# Patient Record
Sex: FEMALE | Race: WHITE | NOT HISPANIC OR LATINO | Employment: OTHER | URBAN - METROPOLITAN AREA
[De-identification: names, ages, dates, MRNs, and addresses within clinical notes are randomized per-mention and may not be internally consistent; named-entity substitution may affect disease eponyms.]

---

## 2022-05-26 RX ORDER — METOPROLOL SUCCINATE 25 MG/1
25 TABLET, EXTENDED RELEASE ORAL DAILY
COMMUNITY
Start: 2022-03-06 | End: 2022-05-26 | Stop reason: SDUPTHER

## 2022-05-26 NOTE — TELEPHONE ENCOUNTER
----- Message from Beverly Cannon sent at 5/26/2022  2:55 PM CDT -----  Regarding: Refill  Type:  RX Refill Request    Who Called: Patient  Refill or New Rx:Refill  RX Name and Strength:Metoprolol 25mg  How is the patient currently taking it? (ex. 1XDay):QD  Is this a 30 day or 90 day RX:30  Preferred Pharmacy with phone number:[facility]  Local or Mail Order:Mail  Ordering Provider:Juan  Would the patient rather a call back or a response via [facility]? Call  Best Call Back Number:[phone]  Additional Information:

## 2022-05-27 RX ORDER — METOPROLOL SUCCINATE 25 MG/1
25 TABLET, EXTENDED RELEASE ORAL DAILY
Qty: 90 TABLET | Refills: 3 | Status: SHIPPED | OUTPATIENT
Start: 2022-05-27 | End: 2023-10-19 | Stop reason: SDUPTHER

## 2022-06-01 DIAGNOSIS — E78.5 HYPERLIPIDEMIA, UNSPECIFIED HYPERLIPIDEMIA TYPE: ICD-10-CM

## 2022-06-01 DIAGNOSIS — I10 HYPERTENSION, UNSPECIFIED TYPE: Primary | ICD-10-CM

## 2022-06-01 DIAGNOSIS — F41.9 ANXIETY: ICD-10-CM

## 2022-06-01 DIAGNOSIS — Z92.29 HX OF LONG TERM USE OF BLOOD THINNERS: ICD-10-CM

## 2022-06-01 RX ORDER — MEMANTINE HYDROCHLORIDE 10 MG/1
10 TABLET ORAL 2 TIMES DAILY
COMMUNITY
Start: 2022-04-05 | End: 2023-04-20

## 2022-06-01 RX ORDER — APIXABAN 5 MG/1
5 TABLET, FILM COATED ORAL 2 TIMES DAILY
COMMUNITY
Start: 2022-03-06 | End: 2022-06-01 | Stop reason: SDUPTHER

## 2022-06-01 RX ORDER — ATORVASTATIN CALCIUM 40 MG/1
40 TABLET, FILM COATED ORAL DAILY
COMMUNITY
End: 2022-06-01 | Stop reason: SDUPTHER

## 2022-06-01 RX ORDER — AMLODIPINE BESYLATE 2.5 MG/1
2.5 TABLET ORAL DAILY
COMMUNITY
Start: 2022-03-06 | End: 2022-06-01 | Stop reason: SDUPTHER

## 2022-06-01 RX ORDER — APIXABAN 5 MG/1
5 TABLET, FILM COATED ORAL 2 TIMES DAILY
Qty: 180 TABLET | Refills: 3 | Status: SHIPPED | OUTPATIENT
Start: 2022-06-01 | End: 2022-07-28

## 2022-06-01 RX ORDER — VENLAFAXINE HYDROCHLORIDE 150 MG/1
150 CAPSULE, EXTENDED RELEASE ORAL DAILY
COMMUNITY
Start: 2022-03-06 | End: 2022-06-01 | Stop reason: SDUPTHER

## 2022-06-01 RX ORDER — VENLAFAXINE HYDROCHLORIDE 150 MG/1
150 CAPSULE, EXTENDED RELEASE ORAL DAILY
Qty: 90 CAPSULE | Refills: 3 | Status: SHIPPED | OUTPATIENT
Start: 2022-06-01 | End: 2023-07-24 | Stop reason: SDUPTHER

## 2022-06-01 RX ORDER — AMLODIPINE BESYLATE 2.5 MG/1
2.5 TABLET ORAL DAILY
Qty: 90 TABLET | Refills: 3 | Status: SHIPPED | OUTPATIENT
Start: 2022-06-01 | End: 2023-07-10 | Stop reason: SDUPTHER

## 2022-06-01 NOTE — TELEPHONE ENCOUNTER
----- Message from Melody Erazo sent at 6/1/2022 11:39 AM CDT -----  Regarding: medication  .Type:  Needs Medical Advice    Who Called: Pt's daughter  Symptoms (please be specific): NA  How long has patient had these symptoms:  NA  Pharmacy name and phone #:  [facility]  Would the patient rather a call back or a response via [facility]? Call back  Best Call Back Number: [phone]  Additional Information: Pt's daughter stated that a lot of meds were called in to [facility] and they received a notification that only 2 were being filled.

## 2022-06-02 RX ORDER — ATORVASTATIN CALCIUM 40 MG/1
40 TABLET, FILM COATED ORAL DAILY
Qty: 90 TABLET | Refills: 3 | Status: SHIPPED | OUTPATIENT
Start: 2022-06-02 | End: 2022-09-28

## 2022-07-24 PROBLEM — Z86.79 HISTORY OF ATRIAL FIBRILLATION: Chronic | Status: ACTIVE | Noted: 2022-07-24

## 2022-07-24 PROBLEM — E78.2 MIXED HYPERLIPIDEMIA: Chronic | Status: ACTIVE | Noted: 2022-07-24

## 2022-07-24 PROBLEM — I10 PRIMARY HYPERTENSION: Chronic | Status: ACTIVE | Noted: 2022-07-24

## 2022-07-24 PROBLEM — E55.9 VITAMIN D DEFICIENCY: Chronic | Status: ACTIVE | Noted: 2022-07-24

## 2022-07-24 PROBLEM — F41.8 ANXIETY WITH DEPRESSION: Chronic | Status: ACTIVE | Noted: 2022-07-24

## 2022-07-24 PROBLEM — F41.8 ANXIETY WITH DEPRESSION: Status: ACTIVE | Noted: 2022-07-24

## 2022-07-24 PROBLEM — E55.9 VITAMIN D DEFICIENCY: Status: ACTIVE | Noted: 2022-07-24

## 2022-07-24 PROBLEM — E78.2 MIXED HYPERLIPIDEMIA: Status: ACTIVE | Noted: 2022-07-24

## 2022-07-24 PROBLEM — I10 PRIMARY HYPERTENSION: Status: ACTIVE | Noted: 2022-07-24

## 2022-07-24 PROBLEM — Z86.79 HISTORY OF ATRIAL FIBRILLATION: Status: ACTIVE | Noted: 2022-07-24

## 2022-07-24 NOTE — PROGRESS NOTES
"Subjective:       Patient ID: Marge Thompson is a 66 y.o. female.    Chief Complaint: Oral Surgical Clearance    HPI   Patient presents to the clinic for clearance for a dental procedure.  Dental procedure may include extractions, possible fillings, would canal, deep cleaning.  Notably, she takes Eliquis daily.  She does not remember how long she has been taking this, and she has not seen a cardiologist in years according to her.  She has a diagnosis of atrial fibrillation, but she has not been in atrial fibrillation either at the last examination or at this examination.  She tends to feel like she would know if she was in AFib, and she states that she does not remember the last time that she had that feeling.    Review of Systems   Constitutional: Negative.  Negative for fatigue and fever.   HENT: Negative.  Negative for sore throat and trouble swallowing.    Eyes: Negative.  Negative for redness and visual disturbance.   Respiratory: Negative.  Negative for chest tightness and shortness of breath.    Cardiovascular: Negative.  Negative for chest pain.   Gastrointestinal: Negative.  Negative for abdominal pain, blood in stool and nausea.   Endocrine: Negative.  Negative for cold intolerance, heat intolerance and polyuria.   Genitourinary: Negative.    Musculoskeletal: Negative.  Negative for arthralgias, gait problem and joint swelling.   Integumentary:  Negative for rash. Negative.   Neurological: Negative.  Negative for dizziness, weakness and headaches.   Psychiatric/Behavioral: Negative.  Negative for agitation and dysphoric mood.         Objective:     Vitals:    07/26/22 1333   BP: 104/76   BP Location: Left arm   Patient Position: Sitting   BP Method: Large (Automatic)   Pulse: 101   Resp: 18   SpO2: 96%   Weight: 87.8 kg (193 lb 9.6 oz)   Height: 5' 11" (1.803 m)   Body mass index is 27 kg/m².     Physical Exam  Constitutional:       Appearance: Normal appearance.   HENT:      Head: Normocephalic.      " Nose: Nose normal.      Mouth/Throat:      Pharynx: Oropharynx is clear.   Eyes:      Conjunctiva/sclera: Conjunctivae normal.   Cardiovascular:      Rate and Rhythm: Normal rate and regular rhythm.   Pulmonary:      Effort: Pulmonary effort is normal.      Breath sounds: Normal breath sounds.   Abdominal:      General: Abdomen is flat. Bowel sounds are normal.      Palpations: Abdomen is soft.   Musculoskeletal:         General: Normal range of motion.      Cervical back: Neck supple.   Skin:     General: Skin is warm and dry.   Neurological:      General: No focal deficit present.      Mental Status: She is oriented to person, place, and time.   Psychiatric:         Mood and Affect: Mood normal.         Behavior: Behavior normal.              Assessment:     Problem List Items Addressed This Visit        Other    Preoperative clearance - Primary    Current Assessment & Plan     More than likely, the patient has not been in atrial fibrillation for some time.  Therefore, we will discontinue the Eliquis completely, and, this is a low risk procedure so she can proceed with the dental procedure 48 hours after the Eliquis has been discontinued.  She would discontinue the Eliquis today.  In the future, we may have to reconsider putting her on a blood thinner depending on her cardiac status, her next appointment is in September, if she has any symptoms of being in atrial fibrillation before then she may contact the clinic or come in for a visit.                    Medication List with Changes/Refills   Current Medications    AMLODIPINE (NORVASC) 2.5 MG TABLET    Take 1 tablet (2.5 mg total) by mouth once daily.    ATORVASTATIN (LIPITOR) 40 MG TABLET    Take 1 tablet (40 mg total) by mouth once daily.    ELIQUIS 5 MG TAB    Take 1 tablet (5 mg total) by mouth 2 (two) times daily.    MEMANTINE (NAMENDA) 10 MG TAB    Take 10 mg by mouth 2 (two) times daily.    METOPROLOL SUCCINATE (TOPROL-XL) 25 MG 24 HR TABLET    Take 1  tablet (25 mg total) by mouth once daily.    VENLAFAXINE (EFFEXOR-XR) 150 MG CP24    Take 1 capsule (150 mg total) by mouth once daily.     Plan:             No follow-ups on file.

## 2022-07-26 ENCOUNTER — OFFICE VISIT (OUTPATIENT)
Dept: PRIMARY CARE CLINIC | Facility: CLINIC | Age: 67
End: 2022-07-26
Payer: MEDICARE

## 2022-07-26 VITALS
WEIGHT: 193.63 LBS | RESPIRATION RATE: 18 BRPM | HEART RATE: 101 BPM | OXYGEN SATURATION: 96 % | SYSTOLIC BLOOD PRESSURE: 104 MMHG | HEIGHT: 71 IN | BODY MASS INDEX: 27.11 KG/M2 | DIASTOLIC BLOOD PRESSURE: 76 MMHG

## 2022-07-26 DIAGNOSIS — Z01.818 PREOPERATIVE CLEARANCE: Primary | ICD-10-CM

## 2022-07-26 PROCEDURE — 99214 PR OFFICE/OUTPT VISIT, EST, LEVL IV, 30-39 MIN: ICD-10-PCS | Mod: ,,, | Performed by: GENERAL PRACTICE

## 2022-07-26 PROCEDURE — 99214 OFFICE O/P EST MOD 30 MIN: CPT | Mod: ,,, | Performed by: GENERAL PRACTICE

## 2022-07-26 NOTE — ASSESSMENT & PLAN NOTE
More than likely, the patient has not been in atrial fibrillation for some time.  Therefore, we will discontinue the Eliquis completely, and, this is a low risk procedure so she can proceed with the dental procedure 48 hours after the Eliquis has been discontinued.  She would discontinue the Eliquis today.  In the future, we may have to reconsider putting her on a blood thinner depending on her cardiac status, her next appointment is in September, if she has any symptoms of being in atrial fibrillation before then she may contact the clinic or come in for a visit.

## 2022-07-28 ENCOUNTER — OFFICE VISIT (OUTPATIENT)
Dept: NEUROLOGY | Facility: CLINIC | Age: 67
End: 2022-07-28
Payer: MEDICARE

## 2022-07-28 VITALS
SYSTOLIC BLOOD PRESSURE: 140 MMHG | DIASTOLIC BLOOD PRESSURE: 64 MMHG | WEIGHT: 192 LBS | HEIGHT: 71 IN | BODY MASS INDEX: 26.88 KG/M2

## 2022-07-28 DIAGNOSIS — F03.90 DEMENTIA WITHOUT BEHAVIORAL DISTURBANCE, UNSPECIFIED DEMENTIA TYPE: Primary | ICD-10-CM

## 2022-07-28 PROCEDURE — 1101F PT FALLS ASSESS-DOCD LE1/YR: CPT | Mod: CPTII,S$GLB,, | Performed by: PSYCHIATRY & NEUROLOGY

## 2022-07-28 PROCEDURE — 1126F AMNT PAIN NOTED NONE PRSNT: CPT | Mod: CPTII,S$GLB,, | Performed by: PSYCHIATRY & NEUROLOGY

## 2022-07-28 PROCEDURE — 1159F MED LIST DOCD IN RCRD: CPT | Mod: CPTII,S$GLB,, | Performed by: PSYCHIATRY & NEUROLOGY

## 2022-07-28 PROCEDURE — 3288F FALL RISK ASSESSMENT DOCD: CPT | Mod: CPTII,S$GLB,, | Performed by: PSYCHIATRY & NEUROLOGY

## 2022-07-28 PROCEDURE — 3077F SYST BP >= 140 MM HG: CPT | Mod: CPTII,S$GLB,, | Performed by: PSYCHIATRY & NEUROLOGY

## 2022-07-28 PROCEDURE — 1159F PR MEDICATION LIST DOCUMENTED IN MEDICAL RECORD: ICD-10-PCS | Mod: CPTII,S$GLB,, | Performed by: PSYCHIATRY & NEUROLOGY

## 2022-07-28 PROCEDURE — 3077F PR MOST RECENT SYSTOLIC BLOOD PRESSURE >= 140 MM HG: ICD-10-PCS | Mod: CPTII,S$GLB,, | Performed by: PSYCHIATRY & NEUROLOGY

## 2022-07-28 PROCEDURE — 3078F DIAST BP <80 MM HG: CPT | Mod: CPTII,S$GLB,, | Performed by: PSYCHIATRY & NEUROLOGY

## 2022-07-28 PROCEDURE — 1126F PR PAIN SEVERITY QUANTIFIED, NO PAIN PRESENT: ICD-10-PCS | Mod: CPTII,S$GLB,, | Performed by: PSYCHIATRY & NEUROLOGY

## 2022-07-28 PROCEDURE — 3078F PR MOST RECENT DIASTOLIC BLOOD PRESSURE < 80 MM HG: ICD-10-PCS | Mod: CPTII,S$GLB,, | Performed by: PSYCHIATRY & NEUROLOGY

## 2022-07-28 PROCEDURE — 99999 PR PBB SHADOW E&M-EST. PATIENT-LVL III: CPT | Mod: PBBFAC,,, | Performed by: PSYCHIATRY & NEUROLOGY

## 2022-07-28 PROCEDURE — 99999 PR PBB SHADOW E&M-EST. PATIENT-LVL III: ICD-10-PCS | Mod: PBBFAC,,, | Performed by: PSYCHIATRY & NEUROLOGY

## 2022-07-28 PROCEDURE — 3008F PR BODY MASS INDEX (BMI) DOCUMENTED: ICD-10-PCS | Mod: CPTII,S$GLB,, | Performed by: PSYCHIATRY & NEUROLOGY

## 2022-07-28 PROCEDURE — 1101F PR PT FALLS ASSESS DOC 0-1 FALLS W/OUT INJ PAST YR: ICD-10-PCS | Mod: CPTII,S$GLB,, | Performed by: PSYCHIATRY & NEUROLOGY

## 2022-07-28 PROCEDURE — 99213 OFFICE O/P EST LOW 20 MIN: CPT | Mod: S$GLB,,, | Performed by: PSYCHIATRY & NEUROLOGY

## 2022-07-28 PROCEDURE — 3288F PR FALLS RISK ASSESSMENT DOCUMENTED: ICD-10-PCS | Mod: CPTII,S$GLB,, | Performed by: PSYCHIATRY & NEUROLOGY

## 2022-07-28 PROCEDURE — 3008F BODY MASS INDEX DOCD: CPT | Mod: CPTII,S$GLB,, | Performed by: PSYCHIATRY & NEUROLOGY

## 2022-07-28 PROCEDURE — 99213 PR OFFICE/OUTPT VISIT, EST, LEVL III, 20-29 MIN: ICD-10-PCS | Mod: S$GLB,,, | Performed by: PSYCHIATRY & NEUROLOGY

## 2022-07-28 RX ORDER — LANOLIN ALCOHOL/MO/W.PET/CERES
1000 CREAM (GRAM) TOPICAL DAILY
COMMUNITY
Start: 2022-03-23

## 2022-07-28 RX ORDER — CHOLECALCIFEROL (VITAMIN D3) 25 MCG
1000 TABLET ORAL DAILY
COMMUNITY
End: 2022-07-28

## 2022-07-28 NOTE — PROGRESS NOTES
Subjective:       Patient ID: Marge Thompson is a 66 y.o. female.    Chief Complaint: Memory Loss (C/o memory about the same), Amnesia, and Medication Refill (Namenda)    HPI short term memory loss x 1  Year or more  Stroke history  Lives alone  Started on namenda 3 mos ago; here for followup  No issues  No SEs  Maybe helping a little  No falls  No issue swith local driving  Review of Systems    The remainder of the 14 system ROS is noncontributory or negative unless mentioned/reviewed above.    Objective:      Physical Exam    Assessment:     Mental Status: Alert and oriented x3. Language is fluent with good comprehension.    Cranial Nerve: Pupils are equal, round, and reactive to light. Visual fields are intact to confrontation. Normal fundi. Ocular movements are intact. Face is symmetric at rest and with activation with intact sensation throughout. Hearing intact to finger rub bilaterally. Muscles of tongue and palate activate symmetrically. No dysarthria. Strength is full in sternocleidomastoid and trapezius bilaterally.    Motor: Muscle bulk and tone are normal. Strength is 5/5 in all four extremities both proximally and distally. Intact fine motor movements bilaterally. There is no pronator drift or satelliting on arm roll.    Sensory: Sensation is intact to light touch, pinprick, vibration, and proprioception throughout. Romberg is negative.    Reflexes: 2+ and symmetric at the biceps, triceps, brachioradialis, patella, and Achilles bilaterally. Plantar response is flexor bilaterally.    Coordination: No dysmetria on finger-nose-finger or heel-knee-shin. Normal rapid alternating movements. Fast finger tapping with normal amplitude and speed.    Gait: Narrow based with normal stride length and good arm swing bilaterally. Able to walk on heels, toes, and in tandem.    Problem List Items Addressed This Visit    None     Visit Diagnoses     Dementia without behavioral disturbance, unspecified dementia type    -   Primary          Plan:       Continue namenda  9 mos f/u

## 2022-09-06 ENCOUNTER — TELEPHONE (OUTPATIENT)
Dept: NEUROLOGY | Facility: CLINIC | Age: 67
End: 2022-09-06
Payer: MEDICARE

## 2022-09-06 DIAGNOSIS — Z78.9 SELF-CARE DEFICIT: Primary | ICD-10-CM

## 2022-09-06 NOTE — TELEPHONE ENCOUNTER
Reports pt has not been letting her dogs out, does not eat, lays in the bed all day, hardly gets up, did not change her clothes for 3 weeks and is not bathing,   Is asking if pt can be referral to home health services.  Is asking if she is not available at [phone] to contact her  Reyes at [phone].    LOV: 0/4/05/22    NOV: none

## 2022-09-06 NOTE — TELEPHONE ENCOUNTER
[facility] they received a referral for this pt and they are not in network with [facility]. [facility] is in network. Phone: [phone], Fax: [phone].    LOV: 07/28/22    NOV: 04/27/23

## 2022-09-15 DIAGNOSIS — E78.2 MIXED HYPERLIPIDEMIA: Primary | Chronic | ICD-10-CM

## 2022-09-15 DIAGNOSIS — Z00.00 ENCOUNTER FOR WELLNESS EXAMINATION: ICD-10-CM

## 2022-09-15 DIAGNOSIS — Z13.6 ENCOUNTER FOR LIPID SCREENING FOR CARDIOVASCULAR DISEASE: ICD-10-CM

## 2022-09-15 DIAGNOSIS — Z13.220 ENCOUNTER FOR LIPID SCREENING FOR CARDIOVASCULAR DISEASE: ICD-10-CM

## 2022-09-22 ENCOUNTER — DOCUMENTATION ONLY (OUTPATIENT)
Dept: PRIMARY CARE CLINIC | Facility: CLINIC | Age: 67
End: 2022-09-22
Payer: MEDICARE

## 2022-09-22 DIAGNOSIS — R04.0 NOSEBLEED: Primary | ICD-10-CM

## 2022-09-24 PROCEDURE — G0180 PR HOME HEALTH MD CERTIFICATION: ICD-10-PCS | Mod: ,,, | Performed by: PSYCHIATRY & NEUROLOGY

## 2022-09-24 PROCEDURE — G0180 MD CERTIFICATION HHA PATIENT: HCPCS | Mod: ,,, | Performed by: PSYCHIATRY & NEUROLOGY

## 2022-09-26 ENCOUNTER — TELEPHONE (OUTPATIENT)
Dept: PRIMARY CARE CLINIC | Facility: CLINIC | Age: 67
End: 2022-09-26
Payer: MEDICARE

## 2022-09-26 NOTE — TELEPHONE ENCOUNTER
----- Message from Briana Dalton sent at 9/23/2022  9:36 AM CDT -----  Regarding: call back  .Type:  Needs Medical Advice    Who Called: Nola Curtis   Symptoms (please be specific):    How long has patient had these symptoms:    Pharmacy name and phone #:    Would the patient rather a call back or a response via [facility]? Call back   Best Call Back Number: [phone]  Additional Information: Nola needs to verify that Dr. Martinez will follow patient once she goes into assisted living.

## 2022-09-27 NOTE — PROGRESS NOTES
"Subjective:       Patient ID: Marge Thompson is a 66 y.o. female.    Chief Complaint: No chief complaint on file.    Patient presents to the clinic to discuss moving to an assisted living facility, and she needs paperwork to be completed.  She does have multiple medical issues but seems to be suffering from significant dementia, worsening short-term memory as of late.  She is definitely not able to take care of herself anymore.  He was hospitalized a few weeks ago for failure to thrive, not eating, laying in bed all day, she has improved a bit from that status.  She does have a DNR form completed.  Review of Systems   Constitutional: Negative.  Negative for fatigue and fever.   HENT: Negative.  Negative for sore throat and trouble swallowing.    Eyes: Negative.  Negative for redness and visual disturbance.   Respiratory: Negative.  Negative for chest tightness and shortness of breath.    Cardiovascular: Negative.  Negative for chest pain.   Gastrointestinal: Negative.  Negative for abdominal pain, blood in stool and nausea.   Endocrine: Negative.  Negative for cold intolerance, heat intolerance and polyuria.   Genitourinary: Negative.    Musculoskeletal: Negative.  Negative for arthralgias, gait problem and joint swelling.   Integumentary:  Negative for rash. Negative.   Neurological: Negative.  Positive for memory loss. Negative for dizziness, weakness and headaches.   Psychiatric/Behavioral: Negative.  Negative for agitation and dysphoric mood.        Objective:     Vitals:    09/28/22 1331   BP: (!) 128/90   BP Location: Right arm   Patient Position: Sitting   BP Method: Large (Automatic)   Pulse: 94   Resp: 16   Temp: 97.4 °F (36.3 °C)   TempSrc: Oral   SpO2: 95%   Weight: 82.1 kg (181 lb)   Height: 5' 11" (1.803 m)   Body mass index is 25.24 kg/m².     Physical Exam  Constitutional:       Appearance: Normal appearance.   HENT:      Head: Normocephalic.      Mouth/Throat:      Pharynx: Oropharynx is clear. "   Eyes:      Conjunctiva/sclera: Conjunctivae normal.      Pupils: Pupils are equal, round, and reactive to light.   Cardiovascular:      Rate and Rhythm: Normal rate and regular rhythm.      Heart sounds: Normal heart sounds.   Pulmonary:      Effort: Pulmonary effort is normal.      Breath sounds: Normal breath sounds.   Abdominal:      General: Abdomen is flat.      Palpations: Abdomen is soft.   Musculoskeletal:         General: Normal range of motion.      Cervical back: Neck supple.   Skin:     General: Skin is warm and dry.   Neurological:      General: No focal deficit present.      Mental Status: She is oriented to person, place, and time.   Psychiatric:         Mood and Affect: Mood normal.         Behavior: Behavior normal.         Thought Content: Thought content normal.         Judgment: Judgment normal.         Assessment:     Problem List Items Addressed This Visit          Neuro    Late onset Alzheimer's dementia without behavioral disturbance (Chronic)    Current Assessment & Plan     I did complete her form, and signed her living will.  She will come back in in six months for a wellness exam.  She promised me that she would try to consume food, and supplements to her food if necessary.               Medication List with Changes/Refills   Current Medications    AMLODIPINE (NORVASC) 2.5 MG TABLET    Take 1 tablet (2.5 mg total) by mouth once daily.    ASPIRIN (ECOTRIN) 81 MG EC TABLET    Take 1 tablet by mouth every morning.    CYANOCOBALAMIN (VITAMIN B-12) 1000 MCG TABLET    Take 1,000 mcg by mouth once daily.    ERGOCALCIFEROL, VITAMIN D2, ORAL    Take 1 tablet by mouth once daily.    MEMANTINE (NAMENDA) 10 MG TAB    Take 10 mg by mouth 2 (two) times daily.    METOPROLOL SUCCINATE (TOPROL-XL) 25 MG 24 HR TABLET    Take 1 tablet (25 mg total) by mouth once daily.    VENLAFAXINE (EFFEXOR-XR) 150 MG CP24    Take 1 capsule (150 mg total) by mouth once daily.   Discontinued Medications    ATORVASTATIN  (LIPITOR) 40 MG TABLET    Take 1 tablet (40 mg total) by mouth once daily.     Plan:             Follow up in about 6 months (around 3/28/2023) for Wellness.

## 2022-09-28 ENCOUNTER — OFFICE VISIT (OUTPATIENT)
Dept: PRIMARY CARE CLINIC | Facility: CLINIC | Age: 67
End: 2022-09-28
Payer: MEDICARE

## 2022-09-28 VITALS
HEART RATE: 94 BPM | SYSTOLIC BLOOD PRESSURE: 128 MMHG | BODY MASS INDEX: 25.34 KG/M2 | WEIGHT: 181 LBS | TEMPERATURE: 97 F | HEIGHT: 71 IN | OXYGEN SATURATION: 95 % | RESPIRATION RATE: 16 BRPM | DIASTOLIC BLOOD PRESSURE: 90 MMHG

## 2022-09-28 DIAGNOSIS — G30.1 LATE ONSET ALZHEIMER'S DEMENTIA WITHOUT BEHAVIORAL DISTURBANCE: Chronic | ICD-10-CM

## 2022-09-28 DIAGNOSIS — F02.80 LATE ONSET ALZHEIMER'S DEMENTIA WITHOUT BEHAVIORAL DISTURBANCE: Chronic | ICD-10-CM

## 2022-09-28 PROBLEM — Z01.818 PREOPERATIVE CLEARANCE: Status: RESOLVED | Noted: 2022-07-26 | Resolved: 2022-09-28

## 2022-09-28 PROBLEM — Z86.59 HISTORY OF ASPERGER'S SYNDROME: Chronic | Status: ACTIVE | Noted: 2022-09-28

## 2022-09-28 PROBLEM — Z86.59 HISTORY OF ASPERGER'S SYNDROME: Chronic | Status: RESOLVED | Noted: 2022-09-28 | Resolved: 2022-09-28

## 2022-09-28 PROCEDURE — 1159F PR MEDICATION LIST DOCUMENTED IN MEDICAL RECORD: ICD-10-PCS | Mod: CPTII,,, | Performed by: GENERAL PRACTICE

## 2022-09-28 PROCEDURE — 99213 OFFICE O/P EST LOW 20 MIN: CPT | Mod: ,,, | Performed by: GENERAL PRACTICE

## 2022-09-28 PROCEDURE — 1160F PR REVIEW ALL MEDS BY PRESCRIBER/CLIN PHARMACIST DOCUMENTED: ICD-10-PCS | Mod: CPTII,,, | Performed by: GENERAL PRACTICE

## 2022-09-28 PROCEDURE — 3074F SYST BP LT 130 MM HG: CPT | Mod: CPTII,,, | Performed by: GENERAL PRACTICE

## 2022-09-28 PROCEDURE — 3008F PR BODY MASS INDEX (BMI) DOCUMENTED: ICD-10-PCS | Mod: CPTII,,, | Performed by: GENERAL PRACTICE

## 2022-09-28 PROCEDURE — 3080F PR MOST RECENT DIASTOLIC BLOOD PRESSURE >= 90 MM HG: ICD-10-PCS | Mod: CPTII,,, | Performed by: GENERAL PRACTICE

## 2022-09-28 PROCEDURE — 3080F DIAST BP >= 90 MM HG: CPT | Mod: CPTII,,, | Performed by: GENERAL PRACTICE

## 2022-09-28 PROCEDURE — 1159F MED LIST DOCD IN RCRD: CPT | Mod: CPTII,,, | Performed by: GENERAL PRACTICE

## 2022-09-28 PROCEDURE — 3074F PR MOST RECENT SYSTOLIC BLOOD PRESSURE < 130 MM HG: ICD-10-PCS | Mod: CPTII,,, | Performed by: GENERAL PRACTICE

## 2022-09-28 PROCEDURE — 3008F BODY MASS INDEX DOCD: CPT | Mod: CPTII,,, | Performed by: GENERAL PRACTICE

## 2022-09-28 PROCEDURE — 1160F RVW MEDS BY RX/DR IN RCRD: CPT | Mod: CPTII,,, | Performed by: GENERAL PRACTICE

## 2022-09-28 PROCEDURE — 99213 PR OFFICE/OUTPT VISIT, EST, LEVL III, 20-29 MIN: ICD-10-PCS | Mod: ,,, | Performed by: GENERAL PRACTICE

## 2022-09-28 RX ORDER — ASPIRIN 81 MG/1
1 TABLET ORAL EVERY MORNING
COMMUNITY
Start: 2022-09-24 | End: 2023-09-24

## 2022-09-28 NOTE — ASSESSMENT & PLAN NOTE
I did complete her form, and signed her living will.  She will come back in in six months for a wellness exam.  She promised me that she would try to consume food, and supplements to her food if necessary.

## 2022-10-11 ENCOUNTER — EXTERNAL HOME HEALTH (OUTPATIENT)
Dept: HOME HEALTH SERVICES | Facility: HOSPITAL | Age: 67
End: 2022-10-11
Payer: MEDICARE

## 2022-10-17 ENCOUNTER — DOCUMENT SCAN (OUTPATIENT)
Dept: HOME HEALTH SERVICES | Facility: HOSPITAL | Age: 67
End: 2022-10-17
Payer: MEDICARE

## 2022-10-27 ENCOUNTER — DOCUMENT SCAN (OUTPATIENT)
Dept: HOME HEALTH SERVICES | Facility: HOSPITAL | Age: 67
End: 2022-10-27
Payer: MEDICARE

## 2022-10-31 ENCOUNTER — DOCUMENT SCAN (OUTPATIENT)
Dept: HOME HEALTH SERVICES | Facility: HOSPITAL | Age: 67
End: 2022-10-31
Payer: MEDICARE

## 2022-11-23 PROCEDURE — G0179 PR HOME HEALTH MD RECERTIFICATION: ICD-10-PCS | Mod: ,,, | Performed by: PSYCHIATRY & NEUROLOGY

## 2022-11-23 PROCEDURE — G0179 MD RECERTIFICATION HHA PT: HCPCS | Mod: ,,, | Performed by: PSYCHIATRY & NEUROLOGY

## 2022-11-30 ENCOUNTER — EXTERNAL HOME HEALTH (OUTPATIENT)
Dept: HOME HEALTH SERVICES | Facility: HOSPITAL | Age: 67
End: 2022-11-30
Payer: MEDICARE

## 2023-04-14 ENCOUNTER — CLINICAL SUPPORT (OUTPATIENT)
Dept: PRIMARY CARE CLINIC | Facility: CLINIC | Age: 68
End: 2023-04-14
Payer: MEDICARE

## 2023-04-14 DIAGNOSIS — Z00.00 ENCOUNTER FOR WELLNESS EXAMINATION: ICD-10-CM

## 2023-04-14 DIAGNOSIS — Z00.00 ENCOUNTER FOR WELLNESS EXAMINATION IN ADULT: Primary | ICD-10-CM

## 2023-04-14 DIAGNOSIS — Z13.6 ENCOUNTER FOR LIPID SCREENING FOR CARDIOVASCULAR DISEASE: ICD-10-CM

## 2023-04-14 DIAGNOSIS — Z13.220 ENCOUNTER FOR LIPID SCREENING FOR CARDIOVASCULAR DISEASE: ICD-10-CM

## 2023-04-14 DIAGNOSIS — E78.2 MIXED HYPERLIPIDEMIA: ICD-10-CM

## 2023-04-14 LAB
ALBUMIN SERPL-MCNC: 3.7 G/DL (ref 3.4–4.8)
ALBUMIN/GLOB SERPL: 1.2 RATIO (ref 1.1–2)
ALP SERPL-CCNC: 97 UNIT/L (ref 40–150)
ALT SERPL-CCNC: 5 UNIT/L (ref 0–55)
AST SERPL-CCNC: 13 UNIT/L (ref 5–34)
BASOPHILS # BLD AUTO: 0.1 X10(3)/MCL (ref 0–0.2)
BASOPHILS NFR BLD AUTO: 1.2 %
BILIRUBIN DIRECT+TOT PNL SERPL-MCNC: 0.3 MG/DL
BUN SERPL-MCNC: 20.5 MG/DL (ref 9.8–20.1)
CALCIUM SERPL-MCNC: 9.2 MG/DL (ref 8.4–10.2)
CHLORIDE SERPL-SCNC: 109 MMOL/L (ref 98–107)
CHOLEST SERPL-MCNC: 235 MG/DL
CHOLEST/HDLC SERPL: 5 {RATIO} (ref 0–5)
CO2 SERPL-SCNC: 25 MMOL/L (ref 23–31)
CREAT SERPL-MCNC: 1.37 MG/DL (ref 0.55–1.02)
EOSINOPHIL # BLD AUTO: 0.24 X10(3)/MCL (ref 0–0.9)
EOSINOPHIL NFR BLD AUTO: 3 %
ERYTHROCYTE [DISTWIDTH] IN BLOOD BY AUTOMATED COUNT: 14.6 % (ref 11.5–17)
GFR SERPLBLD CREATININE-BSD FMLA CKD-EPI: 42 MLS/MIN/1.73/M2
GLOBULIN SER-MCNC: 3 GM/DL (ref 2.4–3.5)
GLUCOSE SERPL-MCNC: 119 MG/DL (ref 82–115)
HCT VFR BLD AUTO: 44.5 % (ref 37–47)
HDLC SERPL-MCNC: 50 MG/DL (ref 35–60)
HGB BLD-MCNC: 14.4 G/DL (ref 12–16)
IMM GRANULOCYTES # BLD AUTO: 0.03 X10(3)/MCL (ref 0–0.04)
IMM GRANULOCYTES NFR BLD AUTO: 0.4 %
LDLC SERPL CALC-MCNC: 152 MG/DL (ref 50–140)
LYMPHOCYTES # BLD AUTO: 2.93 X10(3)/MCL (ref 0.6–4.6)
LYMPHOCYTES NFR BLD AUTO: 36.1 %
MCH RBC QN AUTO: 27.4 PG (ref 27–31)
MCHC RBC AUTO-ENTMCNC: 32.4 G/DL (ref 33–36)
MCV RBC AUTO: 84.6 FL (ref 80–94)
MONOCYTES # BLD AUTO: 0.56 X10(3)/MCL (ref 0.1–1.3)
MONOCYTES NFR BLD AUTO: 6.9 %
NEUTROPHILS # BLD AUTO: 4.25 X10(3)/MCL (ref 2.1–9.2)
NEUTROPHILS NFR BLD AUTO: 52.4 %
NRBC BLD AUTO-RTO: 0 %
PLATELET # BLD AUTO: 247 X10(3)/MCL (ref 130–400)
PMV BLD AUTO: 10.8 FL (ref 7.4–10.4)
POTASSIUM SERPL-SCNC: 4.2 MMOL/L (ref 3.5–5.1)
PROT SERPL-MCNC: 6.7 GM/DL (ref 5.8–7.6)
RBC # BLD AUTO: 5.26 X10(6)/MCL (ref 4.2–5.4)
SODIUM SERPL-SCNC: 143 MMOL/L (ref 136–145)
TRIGL SERPL-MCNC: 166 MG/DL (ref 37–140)
TSH SERPL-ACNC: 2.68 UIU/ML (ref 0.35–4.94)
VLDLC SERPL CALC-MCNC: 33 MG/DL
WBC # SPEC AUTO: 8.1 X10(3)/MCL (ref 4.5–11.5)

## 2023-04-14 PROCEDURE — 36415 PR COLLECTION VENOUS BLOOD,VENIPUNCTURE: ICD-10-PCS | Mod: ,,, | Performed by: GENERAL PRACTICE

## 2023-04-14 PROCEDURE — 80061 LIPID PANEL: CPT | Performed by: GENERAL PRACTICE

## 2023-04-14 PROCEDURE — 86803 HEPATITIS C AB TEST: CPT | Performed by: GENERAL PRACTICE

## 2023-04-14 PROCEDURE — 36415 COLL VENOUS BLD VENIPUNCTURE: CPT | Mod: ,,, | Performed by: GENERAL PRACTICE

## 2023-04-14 PROCEDURE — 36415 COLL VENOUS BLD VENIPUNCTURE: CPT

## 2023-04-14 PROCEDURE — 80053 COMPREHEN METABOLIC PANEL: CPT | Performed by: GENERAL PRACTICE

## 2023-04-14 PROCEDURE — 84443 ASSAY THYROID STIM HORMONE: CPT | Performed by: GENERAL PRACTICE

## 2023-04-14 PROCEDURE — 85025 COMPLETE CBC W/AUTO DIFF WBC: CPT | Performed by: GENERAL PRACTICE

## 2023-04-14 NOTE — PROGRESS NOTES
Wellness labs drawn from the right arm with 21 G/22 G. Pt expressed no pain and tolerated well.

## 2023-04-15 LAB — HCV AB SERPL QL IA: ABNORMAL

## 2023-04-16 PROBLEM — I48.0 INTERMITTENT ATRIAL FIBRILLATION: Status: ACTIVE | Noted: 2023-04-16

## 2023-04-16 PROBLEM — E78.5 DYSLIPIDEMIA: Chronic | Status: ACTIVE | Noted: 2023-04-16

## 2023-04-16 PROBLEM — R41.3 AMNESIA: Status: ACTIVE | Noted: 2023-04-16

## 2023-04-16 PROBLEM — R73.03 PREDIABETES: Chronic | Status: ACTIVE | Noted: 2019-03-16

## 2023-04-16 PROBLEM — R73.03 PREDIABETES: Status: ACTIVE | Noted: 2019-03-16

## 2023-04-16 PROBLEM — E78.2 MIXED HYPERLIPIDEMIA: Chronic | Status: RESOLVED | Noted: 2022-07-24 | Resolved: 2023-04-16

## 2023-04-16 PROBLEM — I45.6: Status: ACTIVE | Noted: 2023-04-16

## 2023-04-17 ENCOUNTER — OFFICE VISIT (OUTPATIENT)
Dept: PRIMARY CARE CLINIC | Facility: CLINIC | Age: 68
End: 2023-04-17
Payer: MEDICARE

## 2023-04-17 VITALS
SYSTOLIC BLOOD PRESSURE: 144 MMHG | OXYGEN SATURATION: 95 % | WEIGHT: 214 LBS | DIASTOLIC BLOOD PRESSURE: 92 MMHG | HEIGHT: 71 IN | HEART RATE: 72 BPM | BODY MASS INDEX: 29.96 KG/M2 | RESPIRATION RATE: 18 BRPM

## 2023-04-17 DIAGNOSIS — E78.5 DYSLIPIDEMIA: Chronic | ICD-10-CM

## 2023-04-17 DIAGNOSIS — I10 PRIMARY HYPERTENSION: Chronic | ICD-10-CM

## 2023-04-17 DIAGNOSIS — R73.03 PREDIABETES: Chronic | ICD-10-CM

## 2023-04-17 DIAGNOSIS — F33.41 RECURRENT MAJOR DEPRESSIVE DISORDER, IN PARTIAL REMISSION: Chronic | ICD-10-CM

## 2023-04-17 DIAGNOSIS — Z86.79 HISTORY OF ATRIAL FIBRILLATION: Chronic | ICD-10-CM

## 2023-04-17 DIAGNOSIS — I48.0 INTERMITTENT ATRIAL FIBRILLATION: ICD-10-CM

## 2023-04-17 DIAGNOSIS — F02.80 LATE ONSET ALZHEIMER'S DEMENTIA WITHOUT BEHAVIORAL DISTURBANCE: ICD-10-CM

## 2023-04-17 DIAGNOSIS — G30.1 LATE ONSET ALZHEIMER'S DEMENTIA WITHOUT BEHAVIORAL DISTURBANCE: ICD-10-CM

## 2023-04-17 DIAGNOSIS — Z00.00 MEDICARE ANNUAL WELLNESS VISIT, SUBSEQUENT: Primary | ICD-10-CM

## 2023-04-17 DIAGNOSIS — N18.32 STAGE 3B CHRONIC KIDNEY DISEASE: Chronic | ICD-10-CM

## 2023-04-17 PROCEDURE — 1159F PR MEDICATION LIST DOCUMENTED IN MEDICAL RECORD: ICD-10-PCS | Mod: CPTII,,, | Performed by: GENERAL PRACTICE

## 2023-04-17 PROCEDURE — G0439 PPPS, SUBSEQ VISIT: HCPCS | Mod: ,,, | Performed by: GENERAL PRACTICE

## 2023-04-17 PROCEDURE — 3288F FALL RISK ASSESSMENT DOCD: CPT | Mod: CPTII,,, | Performed by: GENERAL PRACTICE

## 2023-04-17 PROCEDURE — 1101F PT FALLS ASSESS-DOCD LE1/YR: CPT | Mod: CPTII,,, | Performed by: GENERAL PRACTICE

## 2023-04-17 PROCEDURE — G0439 PR MEDICARE ANNUAL WELLNESS SUBSEQUENT VISIT: ICD-10-PCS | Mod: ,,, | Performed by: GENERAL PRACTICE

## 2023-04-17 PROCEDURE — 3008F PR BODY MASS INDEX (BMI) DOCUMENTED: ICD-10-PCS | Mod: CPTII,,, | Performed by: GENERAL PRACTICE

## 2023-04-17 PROCEDURE — 1101F PR PT FALLS ASSESS DOC 0-1 FALLS W/OUT INJ PAST YR: ICD-10-PCS | Mod: CPTII,,, | Performed by: GENERAL PRACTICE

## 2023-04-17 PROCEDURE — 1159F MED LIST DOCD IN RCRD: CPT | Mod: CPTII,,, | Performed by: GENERAL PRACTICE

## 2023-04-17 PROCEDURE — 1160F PR REVIEW ALL MEDS BY PRESCRIBER/CLIN PHARMACIST DOCUMENTED: ICD-10-PCS | Mod: CPTII,,, | Performed by: GENERAL PRACTICE

## 2023-04-17 PROCEDURE — 3080F PR MOST RECENT DIASTOLIC BLOOD PRESSURE >= 90 MM HG: ICD-10-PCS | Mod: CPTII,,, | Performed by: GENERAL PRACTICE

## 2023-04-17 PROCEDURE — 3080F DIAST BP >= 90 MM HG: CPT | Mod: CPTII,,, | Performed by: GENERAL PRACTICE

## 2023-04-17 PROCEDURE — 3077F SYST BP >= 140 MM HG: CPT | Mod: CPTII,,, | Performed by: GENERAL PRACTICE

## 2023-04-17 PROCEDURE — 3077F PR MOST RECENT SYSTOLIC BLOOD PRESSURE >= 140 MM HG: ICD-10-PCS | Mod: CPTII,,, | Performed by: GENERAL PRACTICE

## 2023-04-17 PROCEDURE — 3008F BODY MASS INDEX DOCD: CPT | Mod: CPTII,,, | Performed by: GENERAL PRACTICE

## 2023-04-17 PROCEDURE — 1160F RVW MEDS BY RX/DR IN RCRD: CPT | Mod: CPTII,,, | Performed by: GENERAL PRACTICE

## 2023-04-17 PROCEDURE — 3288F PR FALLS RISK ASSESSMENT DOCUMENTED: ICD-10-PCS | Mod: CPTII,,, | Performed by: GENERAL PRACTICE

## 2023-04-17 NOTE — ASSESSMENT & PLAN NOTE
Except from her last cardiology note in 2021:     Ms. Marge Thompson is a 65-year-old female with a history of WPW with ablation in 1997, paroxysmal atrial fibrillation, previous CVA, hypertension, and hyperlipidemia. She has had no palpitations since I last saw her in 2019. She denies chest pain or symptoms of congestive heart failure. Her blood pressure appears to be well controlled.    Due to her current health condition, she is no longer seeing her cardiologist, we are managing this.  She is in sinus rhythm today.  No complaints or symptoms.

## 2023-04-17 NOTE — ASSESSMENT & PLAN NOTE
Component Ref Range & Units 2 yr ago   Hemoglobin A1C 4.8 - 5.6 % 6.1 High       Most recent A1c two years ago did show a prediabetes level of 6.1%.  With patient having dementia, there is no need for close monitoring or aggressive management of this condition.

## 2023-04-17 NOTE — ASSESSMENT & PLAN NOTE
Prescribed amlodipine 2.5 mg daily.  Also takes Toprol-XL 25 mg daily, more than likely for combination of arrhythmia and HTN.  Blood pressures appear to be adequately controlled with current treatment plan, including prescription blood pressure medication. Continue medical management and continue home blood pressure checks.  Blood pressure should be checked as discussed during medical visits, and average blood pressure should be no greater than 130/80.

## 2023-04-17 NOTE — ASSESSMENT & PLAN NOTE
Patient is currently in assisted living, doing well, she sees Dr. Mathew, the only other doctor that she sees besides me.  She is prescribed Namenda.

## 2023-04-17 NOTE — PROGRESS NOTES
Patient ID: [id number]     Chief Complaint: Annual Exam      HPI:     Marge Thompson is a 67 y.o. female here today for a Medicare Wellness. No other complaints today.       ----------------------------  Anxiety with depression  Atrial fibrillation  HTN (hypertension)  Mixed hyperlipidemia  Vitamin D deficiency     Past Surgical History:   Procedure Laterality Date    CARDIAC ELECTROPHYSIOLOGY MAPPING AND ABLATION      HYSTERECTOMY         Review of patient's allergies indicates:  No Known Allergies    Outpatient Medications Marked as Taking for the 4/17/23 encounter (Office Visit) with Sb Martinez MD   Medication Sig Dispense Refill    amLODIPine (NORVASC) 2.5 MG tablet Take 1 tablet (2.5 mg total) by mouth once daily. 90 tablet 3    aspirin (ECOTRIN) 81 MG EC tablet Take 1 tablet by mouth every morning.      cyanocobalamin (VITAMIN B-12) 1000 MCG tablet Take 1,000 mcg by mouth once daily.      ERGOCALCIFEROL, VITAMIN D2, ORAL Take 1 tablet by mouth once daily.      memantine (NAMENDA) 10 MG Tab Take 10 mg by mouth 2 (two) times daily.      metoprolol succinate (TOPROL-XL) 25 MG 24 hr tablet Take 1 tablet (25 mg total) by mouth once daily. 90 tablet 3    venlafaxine (EFFEXOR-XR) 150 MG Cp24 Take 1 capsule (150 mg total) by mouth once daily. 90 capsule 3       Social History     Socioeconomic History    Marital status:    Tobacco Use    Smoking status: Some Days    Smokeless tobacco: Never    Tobacco comments:     Synthetic Marijuana   Substance and Sexual Activity    Alcohol use: Not Currently    Drug use: Yes     Types: Marijuana     Comment: Synthetic        No family history on file.     Patient Care Team:  Sb Martinez MD as PCP - General (Family Medicine)  Javier Mathew MD as Consulting Physician (Neurology)     Opioid Screening: Patient medication list reviewed, patient is not taking prescription opioids. Patient is not using additional opioids than prescribed. Patient is at low risk of  substance abuse based on this opioid use history.       Subjective:     Review of Systems   Constitutional: Negative.  Negative for malaise/fatigue and weight loss.   HENT: Negative.     Eyes: Negative.    Respiratory: Negative.  Negative for shortness of breath.    Cardiovascular: Negative.  Negative for chest pain.   Gastrointestinal: Negative.    Genitourinary: Negative.    Musculoskeletal: Negative.    Skin: Negative.    Neurological: Negative.  Negative for focal weakness, weakness and headaches.   Endo/Heme/Allergies: Negative.    Psychiatric/Behavioral: Negative.  Negative for depression and memory loss. The patient is not nervous/anxious.        Patient Reported Health Risk Assessment  What [age] 65-69  Are you male or female?: Female  During the past four weeks, how much have you been bothered by emotional problems such as feeling anxious, depressed, irritable, sad, or downhearted and blue?: Not at all  During the past five weeks, has your physical and/or emotional health limited your social activities with family, friends, neighbors, or groups?: Not at all  During the past four weeks, how much bodily pain have you generally had?: No pain  During the past four weeks, was someone available to help if you needed and wanted help?: Yes, as much as I wanted  During the past four weeks, what was the hardest physical activity you could do for at least two minutes?: Very light  Can you get to places out of walking distance without help?  (For example, can you travel alone on buses or taxis, or drive your own car?): Yes  Can you go shopping for groceries or clothes without someone's help?: No  Can you prepare your own meals?: No  Can you do your own housework without help?: No  Because of any health problems, do you need the help of another person with your personal care needs such as eating, bathing, dressing, or getting around the house?: No  Can you handle your own money without help?: No  During the past  "four weeks, how would you rate your health in general?: Good  How have things been going for you during the past four weeks?: Pretty well  Are you having difficulties driving your car?: Not applicable, I do not use a car  Do you always fasten your seat belt when you are in a car?: Yes, usually  How often in the past four weeks have you been bothered by falling or dizzy when standing up?: Never  How often in the past four weeks have you been bothered by sexual problems?: Never  How often in the past four weeks have you been bothered by trouble eating well?: Never  How often in the past four weeks have you been bothered by teeth or denture problems?: Never  How often in the past four weeks have you been bothered with problems using the telephone?: Never  How often in the past four weeks have you been bothered by tiredness or fatigue?: Never  Have you fallen two or more times in the past year?: Yes  Are you afraid of falling?: No  Are you a smoker?: No  During the past four weeks, how many drinks of wine, beer, or other alcoholic beverages did you have?: No alcohol at all  Do you exercise for about 20 minutes three or more days a week?: No, I usually do not exercise this much  Have you been given any information to help you with hazards in your house that might hurt you?: No  Have you been given any information to help you with keeping track of your medications?: No  How often do you have trouble taking medicines the way you've been told to take them?: I always take them as prescribed  How confident are you that you can control and manage most of your health problems?: Somewhat confident  What is your race? (Check all that apply.):     Objective:     BP (!) 144/92   Pulse 72   Resp 18   Ht 5' 11" (1.803 m)   Wt 97.1 kg (214 lb)   SpO2 95%   BMI 29.85 kg/m²     Physical Exam  Constitutional:       Appearance: Normal appearance.   HENT:      Head: Normocephalic.      Mouth/Throat:      Pharynx: Oropharynx " is clear.   Eyes:      Conjunctiva/sclera: Conjunctivae normal.      Pupils: Pupils are equal, round, and reactive to light.   Cardiovascular:      Rate and Rhythm: Normal rate and regular rhythm.      Heart sounds: Normal heart sounds.   Pulmonary:      Effort: Pulmonary effort is normal.      Breath sounds: Normal breath sounds.   Abdominal:      General: Abdomen is flat.      Palpations: Abdomen is soft.   Musculoskeletal:         General: Normal range of motion.      Cervical back: Neck supple.   Skin:     General: Skin is warm and dry.   Neurological:      General: No focal deficit present.      Mental Status: She is oriented to person, place, and time.   Psychiatric:         Mood and Affect: Mood normal.         Behavior: Behavior normal.         Thought Content: Thought content normal.         Judgment: Judgment normal.       Lab Results   Component Value Date     04/14/2023    K 4.2 04/14/2023    CO2 25 04/14/2023    BUN 20.5 (H) 04/14/2023    CREATININE 1.37 (H) 04/14/2023    CALCIUM 9.2 04/14/2023    ALBUMIN 3.7 04/14/2023    BILITOT 0.3 04/14/2023    ALKPHOS 97 04/14/2023    AST 13 04/14/2023    ALT 5 04/14/2023     Lab Results   Component Value Date    WBC 8.1 04/14/2023    RBC 5.26 04/14/2023    HGB 14.4 04/14/2023    HCT 44.5 04/14/2023    MCV 84.6 04/14/2023    RDW 14.6 04/14/2023     04/14/2023      Lab Results   Component Value Date    CHOL 235 (H) 04/14/2023    TRIG 166 (H) 04/14/2023    HDL 50 04/14/2023    .00 (H) 04/14/2023    TOTALCHOLEST 5 04/14/2023     Lab Results   Component Value Date    TSH 2.683 04/14/2023     Lab Results   Component Value Date    HGBA1C 6.1 (H) 12/21/2020          No flowsheet data found.  Fall Risk Assessment - Outpatient 4/17/2023 7/28/2022   Mobility Status Ambulatory Ambulatory   Number of falls 1 0   Identified as fall risk 0 0           Depression Screening  Over the past two weeks, has the patient felt down, depressed, or hopeless?: No  Over  "the past two weeks, has the patient felt little interest or pleasure in doing things?: No  Functional Ability/Safety Screening  Was the patient's timed Up & Go test unsteady or longer than 30 seconds?: No  Does the patient need help with phone, transportation, shopping, preparing meals, housework, laundry, meds, or managing money?: No  Does the patient's home have rugs in the hallway, lack grab bars in the bathroom, lack handrails on the stairs or have poor lighting?: No  Have you noticed any hearing difficulties?: No  Cognitive Function (Assessed through direct observation with due consideration of information obtained by way of patient reports and/or concerns raised by family, friends, caretakers, or others)    Does the patient repeat questions/statements in the same day?: No  Does the patient have trouble remembering the date, year, and time?: Yes  Does the patient have difficulty managing finances?: Yes  Does the patient have a decreased sense of direction?: Yes  A "Yes" response to any of the above questions could indicate mild cognitive impairment and should trigger further investigation.: Dementia  Assessment:       ICD-10-CM ICD-9-CM   1. Medicare annual wellness visit, subsequent  Z00.00 V70.0   2. Primary hypertension  I10 401.9   3. Dyslipidemia  E78.5 272.4   4. Stage 3b chronic kidney disease  N18.32 585.3   5. Prediabetes  R73.03 790.29   6. Recurrent major depressive disorder, in partial remission  F33.41 296.35   7. Intermittent atrial fibrillation  I48.0 427.31   8. Late onset Alzheimer's dementia without behavioral disturbance  G30.1 331.0    F02.80 294.10   9. History of atrial fibrillation  Z86.79 V12.59        Plan:     Medicare Annual Wellness and Personalized Prevention Plan:   Fall Risk + Home Safety + Hearing Impairment + Depression Screen + Cognitive Impairment Screen + Health Risk Assessment all reviewed.       Health Maintenance Topics with due status: Not Due       Topic Last Completion " Date    Lipid Panel 04/14/2023      The patient's Health Maintenance was reviewed and the following appears to be due at this time:   Health Maintenance Due   Topic Date Due    Hemoglobin A1c (Prediabetes)  12/21/2021         1. Medicare annual wellness visit, subsequent  Comments:  Overall health status was reviewed.  Good health habits reinforced.  Annual wellness exams recommended.    2. Primary hypertension  Assessment & Plan:  Prescribed amlodipine 2.5 mg daily.  Also takes Toprol-XL 25 mg daily, more than likely for combination of arrhythmia and HTN.  Blood pressures appear to be adequately controlled with current treatment plan, including prescription blood pressure medication. Continue medical management and continue home blood pressure checks.  Blood pressure should be checked as discussed during medical visits, and average blood pressure should be no greater than 130/80.      3. Dyslipidemia    4. Stage 3b chronic kidney disease  Assessment & Plan:  Most recent GFR 42.       eGFR mls/min/1.73/m2 42      We will continue to monitor.    Orders:  -     Comprehensive Metabolic Panel; Future; Expected date: 04/17/2024    5. Prediabetes  Assessment & Plan:  Component Ref Range & Units 2 yr ago   Hemoglobin A1C 4.8 - 5.6 % 6.1 High       Most recent A1c two years ago did show a prediabetes level of 6.1%.  With patient having dementia, there is no need for close monitoring or aggressive management of this condition.      6. Recurrent major depressive disorder, in partial remission  Assessment & Plan:  Patient prescribed Effexor 150 mg daily.      7. Intermittent atrial fibrillation    8. Late onset Alzheimer's dementia without behavioral disturbance  Assessment & Plan:  Patient is currently in assisted living, doing well, she sees Dr. Mathew, the only other doctor that she sees besides me.  She is prescribed Namenda.      9. History of atrial fibrillation  Assessment & Plan:  Except from her last cardiology note  in 2021:     Ms. Marge Thompson is a 65-year-old female with a history of WPW with ablation in 1997, paroxysmal atrial fibrillation, previous CVA, hypertension, and hyperlipidemia. She has had no palpitations since I last saw her in 2019. She denies chest pain or symptoms of congestive heart failure. Her blood pressure appears to be well controlled.    Due to her current health condition, she is no longer seeing her cardiologist, we are managing this.  She is in sinus rhythm today.  No complaints or symptoms.                    Medication List with Changes/Refills   Current Medications    AMLODIPINE (NORVASC) 2.5 MG TABLET    Take 1 tablet (2.5 mg total) by mouth once daily.       Start Date: 6/1/2022  End Date: --    ASPIRIN (ECOTRIN) 81 MG EC TABLET    Take 1 tablet by mouth every morning.       Start Date: 9/24/2022 End Date: 9/24/2023    CYANOCOBALAMIN (VITAMIN B-12) 1000 MCG TABLET    Take 1,000 mcg by mouth once daily.       Start Date: 3/23/2022 End Date: --    ERGOCALCIFEROL, VITAMIN D2, ORAL    Take 1 tablet by mouth once daily.       Start Date: 3/23/2022 End Date: --    MEMANTINE (NAMENDA) 10 MG TAB    Take 10 mg by mouth 2 (two) times daily.       Start Date: 4/5/2022  End Date: --    METOPROLOL SUCCINATE (TOPROL-XL) 25 MG 24 HR TABLET    Take 1 tablet (25 mg total) by mouth once daily.       Start Date: 5/27/2022 End Date: --    VENLAFAXINE (EFFEXOR-XR) 150 MG CP24    Take 1 capsule (150 mg total) by mouth once daily.       Start Date: 6/1/2022  End Date: --        Follow up in about 1 year (around 4/17/2024) for Medicare Wellness. In addition to their scheduled follow up, the patient has also been instructed to follow up on as needed basis.

## 2023-04-27 ENCOUNTER — OFFICE VISIT (OUTPATIENT)
Dept: NEUROLOGY | Facility: CLINIC | Age: 68
End: 2023-04-27
Payer: MEDICARE

## 2023-04-27 VITALS
DIASTOLIC BLOOD PRESSURE: 86 MMHG | HEIGHT: 71 IN | SYSTOLIC BLOOD PRESSURE: 130 MMHG | BODY MASS INDEX: 30.41 KG/M2 | WEIGHT: 217.19 LBS

## 2023-04-27 DIAGNOSIS — G31.84 MCI (MILD COGNITIVE IMPAIRMENT): ICD-10-CM

## 2023-04-27 PROCEDURE — 3288F FALL RISK ASSESSMENT DOCD: CPT | Mod: CPTII,S$GLB,, | Performed by: PSYCHIATRY & NEUROLOGY

## 2023-04-27 PROCEDURE — 99999 PR PBB SHADOW E&M-EST. PATIENT-LVL III: CPT | Mod: PBBFAC,,, | Performed by: PSYCHIATRY & NEUROLOGY

## 2023-04-27 PROCEDURE — 1126F AMNT PAIN NOTED NONE PRSNT: CPT | Mod: CPTII,S$GLB,, | Performed by: PSYCHIATRY & NEUROLOGY

## 2023-04-27 PROCEDURE — 3075F SYST BP GE 130 - 139MM HG: CPT | Mod: CPTII,S$GLB,, | Performed by: PSYCHIATRY & NEUROLOGY

## 2023-04-27 PROCEDURE — 3008F BODY MASS INDEX DOCD: CPT | Mod: CPTII,S$GLB,, | Performed by: PSYCHIATRY & NEUROLOGY

## 2023-04-27 PROCEDURE — 3008F PR BODY MASS INDEX (BMI) DOCUMENTED: ICD-10-PCS | Mod: CPTII,S$GLB,, | Performed by: PSYCHIATRY & NEUROLOGY

## 2023-04-27 PROCEDURE — 99213 OFFICE O/P EST LOW 20 MIN: CPT | Mod: S$GLB,,, | Performed by: PSYCHIATRY & NEUROLOGY

## 2023-04-27 PROCEDURE — 1101F PR PT FALLS ASSESS DOC 0-1 FALLS W/OUT INJ PAST YR: ICD-10-PCS | Mod: CPTII,S$GLB,, | Performed by: PSYCHIATRY & NEUROLOGY

## 2023-04-27 PROCEDURE — 1159F MED LIST DOCD IN RCRD: CPT | Mod: CPTII,S$GLB,, | Performed by: PSYCHIATRY & NEUROLOGY

## 2023-04-27 PROCEDURE — 99213 PR OFFICE/OUTPT VISIT, EST, LEVL III, 20-29 MIN: ICD-10-PCS | Mod: S$GLB,,, | Performed by: PSYCHIATRY & NEUROLOGY

## 2023-04-27 PROCEDURE — 3075F PR MOST RECENT SYSTOLIC BLOOD PRESS GE 130-139MM HG: ICD-10-PCS | Mod: CPTII,S$GLB,, | Performed by: PSYCHIATRY & NEUROLOGY

## 2023-04-27 PROCEDURE — 3079F PR MOST RECENT DIASTOLIC BLOOD PRESSURE 80-89 MM HG: ICD-10-PCS | Mod: CPTII,S$GLB,, | Performed by: PSYCHIATRY & NEUROLOGY

## 2023-04-27 PROCEDURE — 1101F PT FALLS ASSESS-DOCD LE1/YR: CPT | Mod: CPTII,S$GLB,, | Performed by: PSYCHIATRY & NEUROLOGY

## 2023-04-27 PROCEDURE — 1126F PR PAIN SEVERITY QUANTIFIED, NO PAIN PRESENT: ICD-10-PCS | Mod: CPTII,S$GLB,, | Performed by: PSYCHIATRY & NEUROLOGY

## 2023-04-27 PROCEDURE — 1159F PR MEDICATION LIST DOCUMENTED IN MEDICAL RECORD: ICD-10-PCS | Mod: CPTII,S$GLB,, | Performed by: PSYCHIATRY & NEUROLOGY

## 2023-04-27 PROCEDURE — 3288F PR FALLS RISK ASSESSMENT DOCUMENTED: ICD-10-PCS | Mod: CPTII,S$GLB,, | Performed by: PSYCHIATRY & NEUROLOGY

## 2023-04-27 PROCEDURE — 3079F DIAST BP 80-89 MM HG: CPT | Mod: CPTII,S$GLB,, | Performed by: PSYCHIATRY & NEUROLOGY

## 2023-04-27 PROCEDURE — 99999 PR PBB SHADOW E&M-EST. PATIENT-LVL III: ICD-10-PCS | Mod: PBBFAC,,, | Performed by: PSYCHIATRY & NEUROLOGY

## 2023-04-27 RX ORDER — MEMANTINE HYDROCHLORIDE 10 MG/1
10 TABLET ORAL 2 TIMES DAILY
Qty: 180 TABLET | Refills: 4 | Status: SHIPPED | OUTPATIENT
Start: 2023-04-27

## 2023-04-27 NOTE — PROGRESS NOTES
"Subjective     Patient ID: Marge Thompson is a 67 y.o. female.    Chief Complaint: Dementia (No c/o today; states "doing good"/) and Medication Refill (Memantine)    HPI  Since last seen, no longer driving and has moved to assisted living  Likes it  No new medical issues  Memory stable  No falls/hallucinations  Still on memantine  Has nursing service admin meds  Review of Systems  The remainder of the 14 system ROS is noncontributory or negative unless mentioned/reviewed above.       Objective     Physical Exam  Mental Status: Alert and oriented x3. Language is fluent with good comprehension.    Cranial Nerve: Pupils are equal, round, and reactive to light. Visual fields are intact to confrontation. Normal fundi. Ocular movements are intact. Face is symmetric at rest and with activation with intact sensation throughout. Hearing intact to finger rub bilaterally. Muscles of tongue and palate activate symmetrically. No dysarthria. Strength is full in sternocleidomastoid and trapezius bilaterally.    Motor: Muscle bulk and tone are normal. Strength is 5/5 in all four extremities both proximally and distally. Intact fine motor movements bilaterally. There is no pronator drift or satelliting on arm roll.    Sensory: Sensation is intact to light touch, pinprick, vibration, and proprioception throughout. Romberg is negative.    Reflexes: 2+ and symmetric at the biceps, triceps, brachioradialis, patella, and Achilles bilaterally. Plantar response is flexor bilaterally.    Coordination: No dysmetria on finger-nose-finger or heel-knee-shin. Normal rapid alternating movements. Fast finger tapping with normal amplitude and speed.    Gait: Narrow based with normal stride length and good arm swing bilaterally. Able to walk on heels, toes, and in tandem.       Assessment and Plan     Problem List Items Addressed This Visit    None  Visit Diagnoses       MCI (mild cognitive impairment)        Relevant Medications    memantine " (NAMENDA) 10 MG Tab            memantine

## 2023-05-02 ENCOUNTER — TELEPHONE (OUTPATIENT)
Dept: PRIMARY CARE CLINIC | Facility: CLINIC | Age: 68
End: 2023-05-02
Payer: MEDICARE

## 2023-05-02 VITALS — DIASTOLIC BLOOD PRESSURE: 86 MMHG | SYSTOLIC BLOOD PRESSURE: 136 MMHG

## 2023-07-10 ENCOUNTER — TELEPHONE (OUTPATIENT)
Dept: PRIMARY CARE CLINIC | Facility: CLINIC | Age: 68
End: 2023-07-10
Payer: MEDICARE

## 2023-07-10 DIAGNOSIS — I10 HYPERTENSION, UNSPECIFIED TYPE: ICD-10-CM

## 2023-07-10 RX ORDER — AMLODIPINE BESYLATE 2.5 MG/1
2.5 TABLET ORAL DAILY
Qty: 90 TABLET | Refills: 3 | Status: SHIPPED | OUTPATIENT
Start: 2023-07-10

## 2023-07-10 NOTE — TELEPHONE ENCOUNTER
----- Message from Yoly Willett sent at 7/10/2023  3:53 PM CDT -----  .Type:  RX Refill Request    Who Called: nurse   Refill or New Rx:refill  RX Name and Strength:amLODIPine (NORVASC) 2.5 MG tablet  How is the patient currently taking it? (ex. 1XDay)  Is this a 30 day or 90 day RX:90  Preferred Pharmacy with phone number:[facility] - [place] [address] [facility]  Local or Mail Order:local   Ordering Provider:Juan  Would the patient rather a call back or a response via [facility]? Call back   Best Call Back Number:[phone]  Additional Information: Nurse at [facility] is call for a refill [phone] call this number pt only has 3 pill left it's the pharmacy number to rush it

## 2023-07-24 ENCOUNTER — TELEPHONE (OUTPATIENT)
Dept: PRIMARY CARE CLINIC | Facility: CLINIC | Age: 68
End: 2023-07-24
Payer: MEDICARE

## 2023-07-24 DIAGNOSIS — F41.9 ANXIETY: ICD-10-CM

## 2023-07-24 RX ORDER — VENLAFAXINE HYDROCHLORIDE 150 MG/1
150 CAPSULE, EXTENDED RELEASE ORAL DAILY
Qty: 90 CAPSULE | Refills: 3 | Status: SHIPPED | OUTPATIENT
Start: 2023-07-24

## 2023-07-24 NOTE — TELEPHONE ENCOUNTER
----- Message from Radhabety Nye sent at 7/24/2023  1:29 PM CDT -----  Regarding: refill  Type:  RX Refill Request    Who Called: franchesca from [facility]  Refill or New Rx:refill  RX Name and Strength:venlafaxine (EFFEXOR-XR) 150 MG Cp24  How is the patient currently taking it? (ex. 1XDay):1xday  Is this a 30 day or 90 day RX:90  Preferred Pharmacy with phone number:[facility]  Local or Mail Order:mail  Ordering Provider:kendal lentz  Would the patient rather a call back or a response via [facility]?   Best Call Back Number:[phone]  Additional Information: eric called about pt needing refill on medication. Please advise

## 2023-10-19 ENCOUNTER — TELEPHONE (OUTPATIENT)
Dept: PRIMARY CARE CLINIC | Facility: CLINIC | Age: 68
End: 2023-10-19
Payer: MEDICARE

## 2023-10-19 DIAGNOSIS — I10 PRIMARY HYPERTENSION: Primary | Chronic | ICD-10-CM

## 2023-10-19 RX ORDER — METOPROLOL SUCCINATE 25 MG/1
25 TABLET, EXTENDED RELEASE ORAL DAILY
Qty: 90 TABLET | Refills: 3 | Status: SHIPPED | OUTPATIENT
Start: 2023-10-19

## 2023-10-19 NOTE — TELEPHONE ENCOUNTER
----- Message from Radhabety Nye sent at 10/19/2023  1:30 PM CDT -----  Regarding: refill  Type:  RX Refill Request    Who Called: eric from [facility]  Refill or New Rx:refill  RX Name and Strength:metoprolol succinate (TOPROL-XL) 25 MG 24 hr tablet  How is the patient currently taking it? (ex. 1XDay):  Is this a 30 day or 90 day RX:  Preferred Pharmacy with phone number:[facility]  Local or Mail Order:local  Ordering Provider:mary carmen edmonds  Would the patient rather a call back or a response via [facility]?   Best Call Back Number:[phone]  Additional Information: eric called about pt needing a refill on medication. Please advise

## 2023-12-28 ENCOUNTER — DOCUMENTATION ONLY (OUTPATIENT)
Dept: PRIMARY CARE CLINIC | Facility: CLINIC | Age: 68
End: 2023-12-28
Payer: MEDICARE

## 2024-04-17 NOTE — PROGRESS NOTES
Patient ID: [id number]     Chief Complaint: Annual Exam      HPI:     Marge Thompson is a 68 y.o. female here today for a Medicare Wellness. No other complaints today.       -------------------------------------    Anxiety with depression    Atrial fibrillation    HTN (hypertension)    Mixed hyperlipidemia    Vitamin D deficiency        Past Surgical History:   Procedure Laterality Date    CARDIAC ELECTROPHYSIOLOGY MAPPING AND ABLATION      HYSTERECTOMY         Review of patient's allergies indicates:  No Known Allergies    Current Outpatient Medications   Medication Sig Dispense Refill    amLODIPine (NORVASC) 2.5 MG tablet Take 1 tablet (2.5 mg total) by mouth once daily. 90 tablet 3    cyanocobalamin (VITAMIN B-12) 1000 MCG tablet Take 1,000 mcg by mouth once daily.      ERGOCALCIFEROL, VITAMIN D2, ORAL Take 1 tablet by mouth once daily.      memantine (NAMENDA) 10 MG Tab Take 1 tablet (10 mg total) by mouth 2 (two) times daily. 180 tablet 4    metoprolol succinate (TOPROL-XL) 25 MG 24 hr tablet Take 1 tablet (25 mg total) by mouth once daily. 90 tablet 3    venlafaxine (EFFEXOR-XR) 150 MG Cp24 Take 1 capsule (150 mg total) by mouth once daily. 90 capsule 3     No current facility-administered medications for this visit.       Social History     Socioeconomic History    Marital status:    Tobacco Use    Smoking status: Some Days    Smokeless tobacco: Never    Tobacco comments:     Synthetic Marijuana   Substance and Sexual Activity    Alcohol use: Not Currently    Drug use: Yes     Types: Marijuana     Comment: Synthetic        No family history on file.     Patient Care Team:  Sb Martinez MD as PCP - General (Family Medicine)  Javier Mathew MD as Consulting Physician (Neurology)     Opioid Screening: Patient medication list reviewed, patient is not taking prescription opioids. Patient is not using additional opioids than prescribed. Patient is at low risk of substance abuse based on this opioid  "use history.       Subjective:     Review of Systems   Constitutional: Negative.  Negative for malaise/fatigue and weight loss.   HENT: Negative.     Eyes: Negative.    Respiratory: Negative.  Negative for shortness of breath.    Cardiovascular: Negative.  Negative for chest pain.   Gastrointestinal: Negative.    Genitourinary: Negative.    Musculoskeletal: Negative.    Skin: Negative.    Neurological: Negative.  Negative for focal weakness, weakness and headaches.   Endo/Heme/Allergies: Negative.    Psychiatric/Behavioral: Negative.  Negative for depression and memory loss. The patient is not nervous/anxious.          Patient Reported Health Risk Assessment       Objective:     /88   Pulse 69   Resp 18   Ht 5' 11" (1.803 m)   Wt 99.8 kg (220 lb)   SpO2 98%   BMI 30.68 kg/m²     Physical Exam  Constitutional:       Appearance: She is obese.   HENT:      Head: Normocephalic.      Mouth/Throat:      Mouth: Mucous membranes are moist.      Pharynx: Oropharynx is clear.   Eyes:      Pupils: Pupils are equal, round, and reactive to light.   Cardiovascular:      Rate and Rhythm: Normal rate and regular rhythm.   Pulmonary:      Effort: Pulmonary effort is normal.      Breath sounds: Normal breath sounds.   Abdominal:      Palpations: Abdomen is soft.   Musculoskeletal:         General: Normal range of motion.   Skin:     General: Skin is warm and dry.   Neurological:      General: No focal deficit present.      Mental Status: She is alert.   Psychiatric:         Mood and Affect: Mood normal.         Behavior: Behavior normal.         Thought Content: Thought content normal.         Judgment: Judgment normal.         Lab Results   Component Value Date     04/14/2023    K 4.2 04/14/2023     09/22/2022    CO2 25 04/14/2023    BUN 20.5 (H) 04/14/2023    CREATININE 1.37 (H) 04/14/2023    CALCIUM 9.2 04/14/2023    ALBUMIN 3.7 04/14/2023    BILITOT 0.3 04/14/2023    ALKPHOS 97 04/14/2023    AST 13 " 04/14/2023    ALT 5 04/14/2023    ANIONGAP 8 09/22/2022    ESTGFRAFRICA 52 09/22/2022    EGFRNORACEVR 42 04/14/2023     Lab Results   Component Value Date    WBC 8.1 04/14/2023    RBC 5.26 04/14/2023    HGB 14.4 04/14/2023    HCT 44.5 04/14/2023    MCV 84.6 04/14/2023    RDW 14.6 04/14/2023     04/14/2023      Lab Results   Component Value Date    CHOL 235 (H) 04/14/2023    TRIG 166 (H) 04/14/2023    HDL 50 04/14/2023    .00 (H) 04/14/2023    TOTALCHOLEST 5 04/14/2023     Lab Results   Component Value Date    TSH 2.683 04/14/2023     Lab Results   Component Value Date    HGBA1C 6.1 (H) 12/21/2020             No data to display                  4/18/2024    10:00 AM 4/27/2023    10:00 AM 4/17/2023    10:30 AM 7/28/2022     9:00 AM   Fall Risk Assessment - Outpatient   Mobility Status Ambulatory Ambulatory Ambulatory Ambulatory   Number of falls 0 1 1 0   Identified as fall risk False False False False              Assessment:       ICD-10-CM ICD-9-CM   1. Medicare annual wellness visit, subsequent  Z00.00 V70.0   2. Primary hypertension  I10 401.9   3. Prediabetes  R73.03 790.29   4. Dyslipidemia  E78.5 272.4   5. Late onset Alzheimer's dementia without behavioral disturbance  G30.1 331.0    F02.80 294.10   6. Intermittent atrial fibrillation  I48.0 427.31   7. Stage 3b chronic kidney disease  N18.32 585.3   8. Vitamin D deficiency  E55.9 268.9   9. Vitamin B 12 deficiency  E53.8 266.2   10. Frailty  R54 797   11. Screening mammogram for breast cancer  Z12.31 V76.12        Plan:     Medicare Annual Wellness and Personalized Prevention Plan:   Fall Risk + Home Safety + Hearing Impairment + Depression Screen + Cognitive Impairment Screen + Health Risk Assessment all reviewed.       Health Maintenance Topics with due status: Not Due       Topic Last Completion Date    Colorectal Cancer Screening 08/24/2016    Lipid Panel 04/14/2023      The patient's Health Maintenance was reviewed and the following  appears to be due at this time:   Health Maintenance Due   Topic Date Due    Annual UACr  Never done    TETANUS VACCINE  Never done    RSV Vaccine (Age 60+ and Pregnant patients) (1 - 1-dose 60+ series) Never done    Shingles Vaccine (2 of 3) 12/01/2015    Pneumococcal Vaccines (Age 65+) (2 of 2 - PCV) 01/27/2018    Hemoglobin A1c (Prediabetes)  12/21/2021    Mammogram  07/02/2022    Influenza Vaccine (1) 09/01/2023    COVID-19 Vaccine (5 - 2023-24 season) 09/01/2023    DEXA Scan  03/24/2024     Health risk assessment:  After review of the patient's medical record as it relates to health risk assessment over the next 5-10 years per recommendations of the USPSTF, it was determined that all pertinent recommendations have been appropriately addressed. The patient does not desire any further preventative care measures or screening tests at this time.  We will continue to reassess at each annual visit.    1. Medicare annual wellness visit, subsequent  Comments:  Overall health status was reviewed.  Good health habits reinforced.  Annual wellness exams recommended.    2. Primary hypertension  Overview:  Prescribed amlodipine 2.5 mg daily. Also takes Toprol-XL 25 mg daily, more than likely for combination of arrhythmia and HTN.     Blood pressures appear to be adequately controlled with current treatment plan, including prescription blood pressure medication. Continue medical management and continue home blood pressure checks. Blood pressure should be checked as discussed during medical visits, and average blood pressure should be no greater than 130/80.     Orders:  -     Comprehensive Metabolic Panel; Future; Expected date: 04/18/2025  -     CBC Auto Differential; Future; Expected date: 04/18/2025  -     TSH; Future; Expected date: 04/18/2025    3. Prediabetes  Overview:  Not prescribed any medication for prediabetes.    No current A1c level available.    Orders:  -     Hemoglobin A1C; Future; Expected date:  04/18/2025    4. Dyslipidemia  Overview:  Currently not being prescribed any lipid-lowering medication.    Due to the patient's advanced age, prescribing statins or other lipid lowering medications is not clinically indicated.  Patient will continue to eat a healthy diet, low in saturated fats, and high in fiber.    Orders:  -     Lipid Panel; Future; Expected date: 04/18/2025    5. Late onset Alzheimer's dementia without behavioral disturbance  Overview:  Prescribed memantine 10 mg b.i.d..  Sees Dr. Wade, neurology.    Patient is currently being evaluated and treated for dementia.  Patient is also taking medication for the purposes of treating this problem.  Reports no significant side effects or problems with the medications, also reporting that memory loss and mental status changes have been stable for the most part.      6. Intermittent atrial fibrillation  Overview:  Except from her last cardiology note in 2021:      Ms. Marge Thompson is a 65-year-old female with a history of WPW with ablation in 1997, paroxysmal atrial fibrillation, previous CVA, hypertension, and hyperlipidemia. She has had no palpitations since I last saw her in 2019. She denies chest pain or symptoms of congestive heart failure. Her blood pressure appears to be well controlled.     Due to her current health condition, she is no longer seeing her cardiologist, we are managing this.  She is in sinus rhythm today.  No complaints or symptoms.      7. Stage 3b chronic kidney disease  Overview:  Most recent GFR 42 ml/min/1.73.  Monitored regularly.    Patient given the following recommendations:  -Follow low sodium diet (2 grams a day)   -Control high blood pressure ( goal BP < 130/80, please record BP at home every day and bring log to next office visit to assure that home cuff is calibrated at minimum every 12 months)   -Exercise at least 30 minutes a day, 5 days a week.   -Maintain healthy weight.   -Stay well hydrated   -Receive  Pneumovax, Flu, and HBV vaccines if indicated.   -Do not take NSAIDs (Ibuprofen, Naproxen, Aleve, Advil, Toradol, Mobic), may take only Tylenol as needed for pain/headaches.   -Take cholesterol-lowering medications as prescribed (LDL goal <100)      8. Vitamin D deficiency  Overview:  Continue vitamin-D supplementation at current level.  Levels will be checked annually.    Orders:  -     Vitamin D; Future; Expected date: 04/18/2025    9. Vitamin B 12 deficiency  Overview:  Patient does take B12 supplementation, continue current level of supplementation.    Orders:  -     Vitamin B12; Future; Expected date: 04/18/2025    10. Frailty  Overview:  Patient has had and continues to have significant problems with ambulation and weakness.  This makes some of her screening tests such as mammogram impractical if not impossible.      11. Screening mammogram for breast cancer  Overview:  Patient has had and continues to have significant problems with ambulation and weakness.  This makes some of her screening tests such as mammogram impractical if not impossible.              Advance Care Planning     Date: 04/17/2024    Living Will  During this visit, I engaged the patient  in the voluntary advance care planning process.  The patient and I reviewed the role for advance directives and their purpose in directing future healthcare if the patient's unable to speak for him/herself.  At this point in time, the patient does have full decision-making capacity.  We discussed different extreme health states that she could experience, and reviewed what kind of medical care she would want in those situations.  The patient communicated that if she were comatose and had little chance of a meaningful recovery, she would not want machines/life-sustaining treatments used. In addition to the above preference, other important end-of-life issues for the patient include no other issues. The patient has completed a living will to reflect these  preferences.  I spent a total of 5 minutes engaging the patient in this advance care planning discussion.              Current Outpatient Medications   Medication Instructions    amLODIPine (NORVASC) 2.5 mg, Oral, Daily    cyanocobalamin (VITAMIN B-12) 1,000 mcg, Oral, Daily    ERGOCALCIFEROL, VITAMIN D2, ORAL 1 tablet, Oral, Daily    memantine (NAMENDA) 10 mg, Oral, 2 times daily    metoprolol succinate (TOPROL-XL) 25 mg, Oral, Daily    venlafaxine (EFFEXOR-XR) 150 mg, Oral, Daily        Follow up in about 1 year (around 4/23/2025) for Medicare Wellness. In addition to their scheduled follow up, the patient has also been instructed to follow up on as needed basis.

## 2024-04-18 ENCOUNTER — OFFICE VISIT (OUTPATIENT)
Dept: PRIMARY CARE CLINIC | Facility: CLINIC | Age: 69
End: 2024-04-18
Payer: MEDICARE

## 2024-04-18 VITALS
WEIGHT: 220 LBS | HEART RATE: 69 BPM | DIASTOLIC BLOOD PRESSURE: 88 MMHG | HEIGHT: 71 IN | OXYGEN SATURATION: 98 % | BODY MASS INDEX: 30.8 KG/M2 | RESPIRATION RATE: 18 BRPM | SYSTOLIC BLOOD PRESSURE: 138 MMHG

## 2024-04-18 DIAGNOSIS — Z12.31 SCREENING MAMMOGRAM FOR BREAST CANCER: ICD-10-CM

## 2024-04-18 DIAGNOSIS — F02.80 LATE ONSET ALZHEIMER'S DEMENTIA WITHOUT BEHAVIORAL DISTURBANCE: Chronic | ICD-10-CM

## 2024-04-18 DIAGNOSIS — E78.5 DYSLIPIDEMIA: Chronic | ICD-10-CM

## 2024-04-18 DIAGNOSIS — N18.32 STAGE 3B CHRONIC KIDNEY DISEASE: Chronic | ICD-10-CM

## 2024-04-18 DIAGNOSIS — E55.9 VITAMIN D DEFICIENCY: Chronic | ICD-10-CM

## 2024-04-18 DIAGNOSIS — Z00.00 MEDICARE ANNUAL WELLNESS VISIT, SUBSEQUENT: Primary | ICD-10-CM

## 2024-04-18 DIAGNOSIS — R73.03 PREDIABETES: Chronic | ICD-10-CM

## 2024-04-18 DIAGNOSIS — I10 PRIMARY HYPERTENSION: Chronic | ICD-10-CM

## 2024-04-18 DIAGNOSIS — I48.0 INTERMITTENT ATRIAL FIBRILLATION: Chronic | ICD-10-CM

## 2024-04-18 DIAGNOSIS — E53.8 VITAMIN B 12 DEFICIENCY: ICD-10-CM

## 2024-04-18 DIAGNOSIS — R54 FRAILTY: Chronic | ICD-10-CM

## 2024-04-18 DIAGNOSIS — G30.1 LATE ONSET ALZHEIMER'S DEMENTIA WITHOUT BEHAVIORAL DISTURBANCE: Chronic | ICD-10-CM

## 2024-04-18 PROCEDURE — 1123F ACP DISCUSS/DSCN MKR DOCD: CPT | Mod: CPTII,,, | Performed by: GENERAL PRACTICE

## 2024-04-18 PROCEDURE — 1159F MED LIST DOCD IN RCRD: CPT | Mod: CPTII,,, | Performed by: GENERAL PRACTICE

## 2024-04-18 PROCEDURE — 3288F FALL RISK ASSESSMENT DOCD: CPT | Mod: CPTII,,, | Performed by: GENERAL PRACTICE

## 2024-04-18 PROCEDURE — 3079F DIAST BP 80-89 MM HG: CPT | Mod: CPTII,,, | Performed by: GENERAL PRACTICE

## 2024-04-18 PROCEDURE — G0439 PPPS, SUBSEQ VISIT: HCPCS | Mod: ,,, | Performed by: GENERAL PRACTICE

## 2024-04-18 PROCEDURE — 1160F RVW MEDS BY RX/DR IN RCRD: CPT | Mod: CPTII,,, | Performed by: GENERAL PRACTICE

## 2024-04-18 PROCEDURE — 1101F PT FALLS ASSESS-DOCD LE1/YR: CPT | Mod: CPTII,,, | Performed by: GENERAL PRACTICE

## 2024-04-18 PROCEDURE — 3075F SYST BP GE 130 - 139MM HG: CPT | Mod: CPTII,,, | Performed by: GENERAL PRACTICE

## 2024-04-30 ENCOUNTER — OFFICE VISIT (OUTPATIENT)
Dept: NEUROLOGY | Facility: CLINIC | Age: 69
End: 2024-04-30
Payer: MEDICARE

## 2024-04-30 VITALS
BODY MASS INDEX: 30.8 KG/M2 | DIASTOLIC BLOOD PRESSURE: 84 MMHG | SYSTOLIC BLOOD PRESSURE: 130 MMHG | WEIGHT: 220 LBS | HEIGHT: 71 IN

## 2024-04-30 DIAGNOSIS — G31.84 MCI (MILD COGNITIVE IMPAIRMENT): ICD-10-CM

## 2024-04-30 PROCEDURE — 99999 PR PBB SHADOW E&M-EST. PATIENT-LVL III: CPT | Mod: PBBFAC,,, | Performed by: PSYCHIATRY & NEUROLOGY

## 2024-04-30 PROCEDURE — 3075F SYST BP GE 130 - 139MM HG: CPT | Mod: CPTII,S$GLB,, | Performed by: PSYCHIATRY & NEUROLOGY

## 2024-04-30 PROCEDURE — 3008F BODY MASS INDEX DOCD: CPT | Mod: CPTII,S$GLB,, | Performed by: PSYCHIATRY & NEUROLOGY

## 2024-04-30 PROCEDURE — 1101F PT FALLS ASSESS-DOCD LE1/YR: CPT | Mod: CPTII,S$GLB,, | Performed by: PSYCHIATRY & NEUROLOGY

## 2024-04-30 PROCEDURE — 3079F DIAST BP 80-89 MM HG: CPT | Mod: CPTII,S$GLB,, | Performed by: PSYCHIATRY & NEUROLOGY

## 2024-04-30 PROCEDURE — 1159F MED LIST DOCD IN RCRD: CPT | Mod: CPTII,S$GLB,, | Performed by: PSYCHIATRY & NEUROLOGY

## 2024-04-30 PROCEDURE — 99213 OFFICE O/P EST LOW 20 MIN: CPT | Mod: S$GLB,,, | Performed by: PSYCHIATRY & NEUROLOGY

## 2024-04-30 PROCEDURE — 1126F AMNT PAIN NOTED NONE PRSNT: CPT | Mod: CPTII,S$GLB,, | Performed by: PSYCHIATRY & NEUROLOGY

## 2024-04-30 PROCEDURE — 3288F FALL RISK ASSESSMENT DOCD: CPT | Mod: CPTII,S$GLB,, | Performed by: PSYCHIATRY & NEUROLOGY

## 2024-04-30 RX ORDER — MEMANTINE HYDROCHLORIDE 10 MG/1
10 TABLET ORAL 2 TIMES DAILY
Qty: 180 TABLET | Refills: 4 | Status: SHIPPED | OUTPATIENT
Start: 2024-04-30

## 2024-04-30 NOTE — PROGRESS NOTES
Subjective     Patient ID: Marge Thompson is a 68 y.o. female.    Chief Complaint: Dementia    HPI  No changes  Doing well on memantine  Lives in assisted living  Sister handles finances  No longer drives  Moderately active  No falls/bhallucinations  Review of Systems  The remainder of the 14 system ROS is noncontributory or negative unless mentioned/reviewed above.       Objective     Physical Exam  Mental Status: Alert and oriented x3. Language is fluent with good comprehension.    Cranial Nerve: Pupils are equal, round, and reactive to light. Visual fields are intact to confrontation. Normal fundi. Ocular movements are intact. Face is symmetric at rest and with activation with intact sensation throughout. Hearing intact to finger rub bilaterally. Muscles of tongue and palate activate symmetrically. No dysarthria. Strength is full in sternocleidomastoid and trapezius bilaterally.    Motor: Muscle bulk and tone are normal. Strength is 5/5 in all four extremities both proximally and distally. Intact fine motor movements bilaterally. There is no pronator drift or satelliting on arm roll.    Sensory: Sensation is intact to light touch, pinprick, vibration, and proprioception throughout. Romberg is negative.    Reflexes: 2+ and symmetric at the biceps, triceps, brachioradialis, patella, and Achilles bilaterally. Plantar response is flexor bilaterally.    Coordination: No dysmetria on finger-nose-finger or heel-knee-shin. Normal rapid alternating movements. Fast finger tapping with normal amplitude and speed.    Gait: Narrow based with normal stride length and good arm swing bilaterally. Able to walk on heels, toes, and in tandem.       Assessment and Plan     1. MCI (mild cognitive impairment)  -     memantine (NAMENDA) 10 MG Tab; Take 1 tablet (10 mg total) by mouth 2 (two) times daily.  Dispense: 180 tablet; Refill: 4        memantine         Follow up in about 1 year (around 4/30/2025).

## 2024-07-15 ENCOUNTER — TELEPHONE (OUTPATIENT)
Dept: PRIMARY CARE CLINIC | Facility: CLINIC | Age: 69
End: 2024-07-15
Payer: MEDICARE

## 2024-07-15 DIAGNOSIS — I10 HYPERTENSION, UNSPECIFIED TYPE: ICD-10-CM

## 2024-07-15 RX ORDER — AMLODIPINE BESYLATE 2.5 MG/1
2.5 TABLET ORAL DAILY
Qty: 90 TABLET | Refills: 3 | Status: SHIPPED | OUTPATIENT
Start: 2024-07-15

## 2024-07-15 NOTE — TELEPHONE ENCOUNTER
----- Message from Anitra Chung sent at 7/15/2024 11:16 AM CDT -----  .Who Called: Brooke  with [facility]    Refill or New Rx:Refill  RX Name and Strength:amLODIPine (NORVASC) 2.5 MG tablet  How is the patient currently taking it? (ex. 1XDay):Sig - Route: Take 1 tablet (2.5 mg total) by mouth once daily. - Oral  Is this a 30 day or 90 day RX:90  Local or Mail Order:Local  List of preferred pharmacies on file (remove unneeded): [facility] [address] [place] [address]    [phone]    Ordering Provider:Dr. Basurto      Preferred Method of Contact: Phone Call  Patient's Preferred Phone Number on File: [phone]   Best Call Back Number, if different: [phone] (Brooke)  Additional Information: Caller stating pt has been without meds for two weeks. Request was sent over, no response. Please advise, thank you.

## 2024-08-12 ENCOUNTER — TELEPHONE (OUTPATIENT)
Dept: PRIMARY CARE CLINIC | Facility: CLINIC | Age: 69
End: 2024-08-12
Payer: MEDICARE

## 2024-08-12 RX ORDER — ASPIRIN 81 MG/1
81 TABLET ORAL ONCE
COMMUNITY
Start: 2024-08-09 | End: 2024-08-30

## 2024-08-12 RX ORDER — CLOPIDOGREL BISULFATE 75 MG/1
1 TABLET ORAL EVERY MORNING
COMMUNITY
Start: 2024-08-10 | End: 2025-08-10

## 2024-08-12 NOTE — TELEPHONE ENCOUNTER
[facility] Appointments    Patient was contacted within 2 business days by telephone/ email. Or face to face  Discharge date:08/09  Primary discharge Dx: TIA  How pt is feeling since d/c: pt still confused  Medication Reconciliation: no new meds  Was the pt able to  new meds?no new meds  Any new problems obtaining new meds?no new meds  Are there any questions regarding meds?no

## 2024-08-12 NOTE — TELEPHONE ENCOUNTER
----- Message from Radha Nye sent at 8/12/2024 11:53 AM CDT -----  Regarding: advice  Who Called: lb with [facility] at [facility]     Caller is requesting assistance/information from provider's office.    Symptoms (please be specific): congestion, cough, no temp   How long has patient had these symptoms:  sunday    Best Call Back Number, if different: [phone]    Additional Information: stated that pt tested positive for covid this morning and is symptomatic and wanted to know if she can get orders for the pt. Please advise

## 2024-08-12 NOTE — TELEPHONE ENCOUNTER
Patient is in [facility]/prescription for Paxlovid to treat COVID tested positive 8/12/24. Symptoms of cough and congestion started  4 days ago after being discharged from hospital, admitted 8/7/24 DX: TIA

## 2024-08-12 NOTE — TELEPHONE ENCOUNTER
----- Message from Chidi Bunch sent at 8/12/2024 10:23 AM CDT -----  Who Called: [facility]    Caller is requesting a sooner appointment. Caller declined first available appointment listed below. Caller will not accept being placed on the waitlist and is requesting a message be sent to doctor.    When is the first available appointment?10/14  Options offered (Virtual Visit, Urgent Care):   Symptoms:Hospital f/u      Preferred Method of Contact: Phone Call  Patient's Preferred Phone Number on File: [phone]  Best Call Back Number, if different:  Additional Information: Pt was discharged from [facility] (08/09) and was told to follow up with her PCP.

## 2024-08-25 PROBLEM — Z79.82 LONG TERM (CURRENT) USE OF ASPIRIN: Status: ACTIVE | Noted: 2022-01-01

## 2024-08-25 PROBLEM — Z86.73 HISTORY OF TIA (TRANSIENT ISCHEMIC ATTACK): Status: ACTIVE | Noted: 2022-01-01

## 2024-08-25 PROBLEM — I48.0 PAROXYSMAL ATRIAL FIBRILLATION: Status: ACTIVE | Noted: 2022-01-01

## 2024-08-25 PROBLEM — F33.41 MAJOR DEPRESSIVE DISORDER, RECURRENT, IN PARTIAL REMISSION: Status: ACTIVE | Noted: 2022-11-18

## 2024-08-25 PROBLEM — Z09 HOSPITAL DISCHARGE FOLLOW-UP: Status: ACTIVE | Noted: 2024-08-25

## 2024-08-25 NOTE — PROGRESS NOTES
"Subjective:       Patient ID: Marge Thompson is a 68 y.o. female.    Chief Complaint: No chief complaint on file.    Established patient, presents to the clinic for follow-up after a recent hospitalization, hospital discharge on 08/09/2024 for a TIA with the following hospital course:    Hospital Course:   68 y.o. female with PMH of CVA,HLD, hypertension, vitamin deficiency. The patient presented to this hospital on 8/7/2024 with a primary complaint of left lower extremity "tingling". ED workup included CT of the brain was negative for acute pathology. At the time evaluation she is awake alert and oriented denies exudate denies shortness breath chest pain denies nausea vomiting diarrhea abdominal pain. She is moving all 4 extremities with equal strength she still endorses some left lower extremity tingling. She is admitted observation, and will get MRI for further evaluation   Neuro consulted  Mri revealing subacute cva  Symptoms improved  Dc home on neuro reccs with hh with close pcp and neuro follow up     She is doing fairly well after her hospitalization, she still feels a slight strange numbness type sensation in her left leg, something that has possibly been there for quite awhile.  Also, she was restarted on anticoagulation therapy, she had been on Eliquis for years, I discontinued her Eliquis in 2022, she was restarted on Plavix and aspirin.  She does not wish to see a cardiologist.      Review of Systems   Constitutional: Negative.  Negative for fatigue and fever.   HENT: Negative.  Negative for sore throat and trouble swallowing.    Eyes: Negative.  Negative for redness and visual disturbance.   Respiratory: Negative.  Negative for chest tightness and shortness of breath.    Cardiovascular: Negative.  Negative for chest pain.   Gastrointestinal: Negative.  Negative for abdominal pain, blood in stool and nausea.   Endocrine: Negative.  Negative for cold intolerance, heat intolerance and polyuria. " "  Genitourinary: Negative.    Musculoskeletal: Negative.  Negative for arthralgias, gait problem and joint swelling.   Integumentary:  Negative for rash. Negative.   Neurological: Negative.  Negative for dizziness, weakness and headaches.   Psychiatric/Behavioral: Negative.  Negative for agitation and dysphoric mood.            Patient Care Team:  Sb Martinez MD as PCP - General (Family Medicine)  Mathew, Javier BLEVINS MD as Consulting Physician (Neurology)  Objective:   Visit Vitals  /78   Pulse 65   Resp 18   Ht 5' 11" (1.803 m)   Wt 99.8 kg (220 lb)   SpO2 95%   BMI 30.68 kg/m²        Physical Exam  Constitutional:       Appearance: Normal appearance.   HENT:      Head: Normocephalic.      Mouth/Throat:      Mouth: Mucous membranes are moist.      Pharynx: Oropharynx is clear.   Eyes:      Conjunctiva/sclera: Conjunctivae normal.      Pupils: Pupils are equal, round, and reactive to light.   Cardiovascular:      Rate and Rhythm: Normal rate and regular rhythm.      Heart sounds: Normal heart sounds.   Pulmonary:      Effort: Pulmonary effort is normal.      Breath sounds: Normal breath sounds.   Abdominal:      General: Abdomen is flat.      Palpations: Abdomen is soft.   Musculoskeletal:         General: Normal range of motion.      Cervical back: Neck supple.   Skin:     General: Skin is warm and dry.   Neurological:      General: No focal deficit present.      Mental Status: She is alert and oriented to person, place, and time.      Comments: Full neurological function bilaterally, no weakness   Psychiatric:         Mood and Affect: Mood normal.         Behavior: Behavior normal.         Thought Content: Thought content normal.         Judgment: Judgment normal.           Lab Results   Component Value Date     04/14/2023    K 4.2 04/14/2023     (H) 04/14/2023    CO2 25 04/14/2023    BUN 20.5 (H) 04/14/2023    CREATININE 1.37 (H) 04/14/2023    CALCIUM 9.2 04/14/2023    ALBUMIN 3.7 04/14/2023    " BILITOT 0.3 04/14/2023    ALKPHOS 97 04/14/2023    AST 13 04/14/2023    ALT 5 04/14/2023    ANIONGAP 8 09/22/2022    ESTGFRAFRICA 52 09/22/2022    EGFRNORACEVR 42 04/14/2023     Lab Results   Component Value Date    WBC 8.1 04/14/2023    RBC 5.26 04/14/2023    HGB 14.4 04/14/2023    HCT 44.5 04/14/2023    MCV 84.6 04/14/2023    RDW 14.6 04/14/2023     04/14/2023      Lab Results   Component Value Date    CHOL 200 (H) 08/08/2024    TRIG 245 (H) 08/08/2024    HDL 30 (L) 08/08/2024    .00 (H) 04/14/2023    TOTALCHOLEST 5 04/14/2023     Lab Results   Component Value Date    TSH 2.683 04/14/2023     Lab Results   Component Value Date    HGBA1C 5.8 08/07/2024        Assessment:       ICD-10-CM ICD-9-CM   1. Hospital discharge follow-up  Z09 V67.59   2. History of TIA (transient ischemic attack)  Z86.73 V12.54   3. Frailty  R54 797   4. History of CVA (cerebrovascular accident)  Z86.73 V12.54   5. Late onset Alzheimer's dementia without behavioral disturbance  G30.1 331.0    F02.80 294.10   6. History of falling  Z91.81 V15.88       Current Outpatient Medications   Medication Instructions    amLODIPine (NORVASC) 2.5 mg, Oral, Daily    aspirin (ECOTRIN) 81 mg, Oral, Once    clopidogreL (PLAVIX) 75 mg tablet 1 tablet, Oral, Every morning    cyanocobalamin (VITAMIN B-12) 1,000 mcg, Oral, Daily    ERGOCALCIFEROL, VITAMIN D2, ORAL 1 tablet, Oral, Daily    memantine (NAMENDA) 10 mg, Oral, 2 times daily    metoprolol succinate (TOPROL-XL) 25 mg, Oral, Daily    venlafaxine (EFFEXOR-XR) 150 mg, Oral, Daily     Plan:     Problem List Items Addressed This Visit          Neuro    Late onset Alzheimer's dementia without behavioral disturbance (Chronic)    Overview     Prescribed memantine 10 mg b.i.d..  Sees Dr. Wade, neurology.    Her dementia does seem to be progressing steadily.    Patient is currently being evaluated and treated for dementia.  Patient is also taking medication for the purposes of treating this  problem.  Reports no significant side effects or problems with the medications, also reporting that memory loss and mental status changes have been stable for the most part.         History of CVA (cerebrovascular accident) (Chronic)    Overview     Prescribed Plavix 75 mg daily.    Restarted during her hospitalization on 08/10/2024.  We will keep her on this medication for the time being, possibly indefinitely.         History of TIA (transient ischemic attack)       Palliative Care    History of falling (Chronic)    Overview     Patient has dementia, has been quite unstable as of late, this is progressing.  She certainly needs some type of device, I will order a roller walker to provide her some stability to hopefully prevent future falling.            Other    Frailty (Chronic)    Overview     Patient has had and continues to have significant problems with ambulation and weakness.  This makes some of her screening tests such as mammogram impractical if not impossible.    Patient does need something to help her ambulate, in my suggestion, she would do well to have a Rollator.  This is a walker with wheels, something that should help her be more steady, and prevent injury.         Hospital discharge follow-up - Primary    Overview     Recently hospitalized with a mild stroke.                    Follow up for next appointment as scheduled or as needed.    This note was created with the assistance of [employer] voice recognition software or phone dictation. There may be transcription errors as a result of using this technology. Effort has been made to assure accuracy of transcription but any obvious errors or omissions should be clarified with the author of the document.

## 2024-08-26 ENCOUNTER — OFFICE VISIT (OUTPATIENT)
Dept: PRIMARY CARE CLINIC | Facility: CLINIC | Age: 69
End: 2024-08-26
Payer: MEDICARE

## 2024-08-26 VITALS
DIASTOLIC BLOOD PRESSURE: 78 MMHG | BODY MASS INDEX: 30.8 KG/M2 | HEART RATE: 65 BPM | WEIGHT: 220 LBS | RESPIRATION RATE: 18 BRPM | HEIGHT: 71 IN | SYSTOLIC BLOOD PRESSURE: 138 MMHG | OXYGEN SATURATION: 95 %

## 2024-08-26 DIAGNOSIS — G30.1 LATE ONSET ALZHEIMER'S DEMENTIA WITHOUT BEHAVIORAL DISTURBANCE: Chronic | ICD-10-CM

## 2024-08-26 DIAGNOSIS — R54 FRAILTY: Chronic | ICD-10-CM

## 2024-08-26 DIAGNOSIS — Z09 HOSPITAL DISCHARGE FOLLOW-UP: Primary | ICD-10-CM

## 2024-08-26 DIAGNOSIS — Z86.73 HISTORY OF CVA (CEREBROVASCULAR ACCIDENT): Chronic | ICD-10-CM

## 2024-08-26 DIAGNOSIS — F02.80 LATE ONSET ALZHEIMER'S DEMENTIA WITHOUT BEHAVIORAL DISTURBANCE: Chronic | ICD-10-CM

## 2024-08-26 DIAGNOSIS — Z86.73 HISTORY OF TIA (TRANSIENT ISCHEMIC ATTACK): ICD-10-CM

## 2024-08-26 DIAGNOSIS — Z91.81 HISTORY OF FALLING: Chronic | ICD-10-CM

## 2024-08-26 PROCEDURE — 99213 OFFICE O/P EST LOW 20 MIN: CPT | Mod: ,,, | Performed by: GENERAL PRACTICE

## 2024-08-26 PROCEDURE — 3078F DIAST BP <80 MM HG: CPT | Mod: CPTII,,, | Performed by: GENERAL PRACTICE

## 2024-08-26 PROCEDURE — 3008F BODY MASS INDEX DOCD: CPT | Mod: CPTII,,, | Performed by: GENERAL PRACTICE

## 2024-08-26 PROCEDURE — 1159F MED LIST DOCD IN RCRD: CPT | Mod: CPTII,,, | Performed by: GENERAL PRACTICE

## 2024-08-26 PROCEDURE — 1160F RVW MEDS BY RX/DR IN RCRD: CPT | Mod: CPTII,,, | Performed by: GENERAL PRACTICE

## 2024-08-26 PROCEDURE — 3044F HG A1C LEVEL LT 7.0%: CPT | Mod: CPTII,,, | Performed by: GENERAL PRACTICE

## 2024-08-26 PROCEDURE — 3075F SYST BP GE 130 - 139MM HG: CPT | Mod: CPTII,,, | Performed by: GENERAL PRACTICE

## 2024-08-27 PROBLEM — Z91.81 HISTORY OF FALLING: Chronic | Status: ACTIVE | Noted: 2024-08-27

## 2024-09-04 ENCOUNTER — TELEPHONE (OUTPATIENT)
Dept: PRIMARY CARE CLINIC | Facility: CLINIC | Age: 69
End: 2024-09-04
Payer: MEDICARE

## 2024-09-04 NOTE — TELEPHONE ENCOUNTER
----- Message from John La sent at 9/4/2024 10:51 AM CDT -----  .Type:  Needs Medical Advice    Who Called: Marge  Symptoms (please be specific):    How long has patient had these symptoms:    Pharmacy name and phone #:    Would the patient rather a call back or a response via [facility]?   Best Call Back Number: [phone]  Additional Information: Patient called needed to speak with the nurse about a walker that is supposed to be ordered for her from last week.

## 2025-02-04 ENCOUNTER — TELEPHONE (OUTPATIENT)
Dept: PRIMARY CARE CLINIC | Facility: CLINIC | Age: 70
End: 2025-02-04
Payer: MEDICARE

## 2025-02-04 DIAGNOSIS — I10 PRIMARY HYPERTENSION: Primary | Chronic | ICD-10-CM

## 2025-02-04 RX ORDER — METOPROLOL SUCCINATE 25 MG/1
25 TABLET, EXTENDED RELEASE ORAL DAILY
Qty: 90 TABLET | Refills: 3 | Status: SHIPPED | OUTPATIENT
Start: 2025-02-04

## 2025-02-04 NOTE — TELEPHONE ENCOUNTER
----- Message from Linnea sent at 2/4/2025  1:27 PM CST -----  Regarding: med refill  .Who Called:Vandana  Refill or New Rx:Refill  RX Name and Strength:metoprolol succinate (TOPROL-XL) 25 MG 24 hr tablet  How is the patient currently taking it? (ex. 1XDay):1xday  Is this a 30 day or 90 day RX:90  Local or Mail Order:local  List of preferred pharmacies on file (remove unneeded): [id number] [place], [place] - [address]  AT [facility] [address]  Phone: [phone]  Fax: [phone]      Ordering Provider:Juan      Preferred Method of Contact: Phone Call  Patient's Preferred Phone Number on File: [phone]   Best Call Back Number, if different:  Additional Information: pt needs authorization from  to refill prescription

## 2025-04-17 ENCOUNTER — CLINICAL SUPPORT (OUTPATIENT)
Dept: PRIMARY CARE CLINIC | Facility: CLINIC | Age: 70
End: 2025-04-17
Payer: MEDICARE

## 2025-04-17 DIAGNOSIS — E53.8 VITAMIN B 12 DEFICIENCY: ICD-10-CM

## 2025-04-17 DIAGNOSIS — I10 PRIMARY HYPERTENSION: ICD-10-CM

## 2025-04-17 DIAGNOSIS — R73.03 PREDIABETES: Chronic | ICD-10-CM

## 2025-04-17 DIAGNOSIS — E78.5 DYSLIPIDEMIA: Chronic | ICD-10-CM

## 2025-04-17 DIAGNOSIS — E55.9 VITAMIN D DEFICIENCY: Chronic | ICD-10-CM

## 2025-04-17 LAB
25(OH)D3+25(OH)D2 SERPL-MCNC: 16 NG/ML (ref 30–80)
ALBUMIN SERPL-MCNC: 3.4 G/DL (ref 3.4–4.8)
ALBUMIN/GLOB SERPL: 1 RATIO (ref 1.1–2)
ALP SERPL-CCNC: 93 UNIT/L (ref 40–150)
ALT SERPL-CCNC: 6 UNIT/L (ref 0–55)
ANION GAP SERPL CALC-SCNC: 6 MEQ/L
AST SERPL-CCNC: 13 UNIT/L (ref 11–45)
BASOPHILS # BLD AUTO: 0.09 X10(3)/MCL
BASOPHILS NFR BLD AUTO: 1.3 %
BILIRUB SERPL-MCNC: 0.3 MG/DL
BUN SERPL-MCNC: 17.9 MG/DL (ref 9.8–20.1)
CALCIUM SERPL-MCNC: 8.8 MG/DL (ref 8.4–10.2)
CHLORIDE SERPL-SCNC: 104 MMOL/L (ref 98–107)
CHOLEST SERPL-MCNC: 220 MG/DL
CHOLEST/HDLC SERPL: 5 {RATIO} (ref 0–5)
CO2 SERPL-SCNC: 29 MMOL/L (ref 23–31)
CREAT SERPL-MCNC: 1.18 MG/DL (ref 0.55–1.02)
CREAT/UREA NIT SERPL: 15
EOSINOPHIL # BLD AUTO: 0.2 X10(3)/MCL (ref 0–0.9)
EOSINOPHIL NFR BLD AUTO: 2.8 %
ERYTHROCYTE [DISTWIDTH] IN BLOOD BY AUTOMATED COUNT: 14.2 % (ref 11.5–17)
EST. AVERAGE GLUCOSE BLD GHB EST-MCNC: 119.8 MG/DL
GFR SERPLBLD CREATININE-BSD FMLA CKD-EPI: 50 ML/MIN/1.73/M2
GLOBULIN SER-MCNC: 3.3 GM/DL (ref 2.4–3.5)
GLUCOSE SERPL-MCNC: 128 MG/DL (ref 82–115)
HBA1C MFR BLD: 5.8 %
HCT VFR BLD AUTO: 42.7 % (ref 37–47)
HDLC SERPL-MCNC: 42 MG/DL (ref 35–60)
HGB BLD-MCNC: 13.9 G/DL (ref 12–16)
IMM GRANULOCYTES # BLD AUTO: 0.01 X10(3)/MCL (ref 0–0.04)
IMM GRANULOCYTES NFR BLD AUTO: 0.1 %
LDLC SERPL CALC-MCNC: 143 MG/DL (ref 50–140)
LYMPHOCYTES # BLD AUTO: 2 X10(3)/MCL (ref 0.6–4.6)
LYMPHOCYTES NFR BLD AUTO: 28.2 %
MCH RBC QN AUTO: 27.6 PG (ref 27–31)
MCHC RBC AUTO-ENTMCNC: 32.6 G/DL (ref 33–36)
MCV RBC AUTO: 84.7 FL (ref 80–94)
MONOCYTES # BLD AUTO: 0.4 X10(3)/MCL (ref 0.1–1.3)
MONOCYTES NFR BLD AUTO: 5.6 %
NEUTROPHILS # BLD AUTO: 4.39 X10(3)/MCL (ref 2.1–9.2)
NEUTROPHILS NFR BLD AUTO: 62 %
NRBC BLD AUTO-RTO: 0 %
PLATELET # BLD AUTO: 200 X10(3)/MCL (ref 130–400)
PMV BLD AUTO: 10 FL (ref 7.4–10.4)
POTASSIUM SERPL-SCNC: 3.9 MMOL/L (ref 3.5–5.1)
PROT SERPL-MCNC: 6.7 GM/DL (ref 5.8–7.6)
RBC # BLD AUTO: 5.04 X10(6)/MCL (ref 4.2–5.4)
SODIUM SERPL-SCNC: 139 MMOL/L (ref 136–145)
TRIGL SERPL-MCNC: 177 MG/DL (ref 37–140)
TSH SERPL-ACNC: 1.8 UIU/ML (ref 0.35–4.94)
VIT B12 SERPL-MCNC: 1637 PG/ML (ref 213–816)
VLDLC SERPL CALC-MCNC: 35 MG/DL
WBC # BLD AUTO: 7.09 X10(3)/MCL (ref 4.5–11.5)

## 2025-04-22 PROBLEM — I48.0 PAROXYSMAL ATRIAL FIBRILLATION: Chronic | Status: ACTIVE | Noted: 2022-01-01

## 2025-04-22 NOTE — PROGRESS NOTES
Patient ID: [id number]     Chief Complaint: Medicare AW Follow Up      HPI:     Marge Thompson is a 69 y.o. female here today for a Medicare Wellness. No other complaints today.       -------------------------------------    Anxiety with depression    Atrial fibrillation    HTN (hypertension)    Mixed hyperlipidemia    Vitamin D deficiency        Past Surgical History:   Procedure Laterality Date    CARDIAC ELECTROPHYSIOLOGY MAPPING AND ABLATION      HYSTERECTOMY         Review of patient's allergies indicates:  No Known Allergies    Outpatient Medications Marked as Taking for the 4/23/25 encounter (Office Visit) with Sb Martinez MD   Medication Sig Dispense Refill    amLODIPine (NORVASC) 2.5 MG tablet Take 1 tablet (2.5 mg total) by mouth once daily. 90 tablet 3    cholecalciferol, vitamin D3, (VITAMIN D3 ORAL) Take 2,000 Units by mouth Daily.      cyanocobalamin (VITAMIN B-12) 1000 MCG tablet Take 1,000 mcg by mouth once daily.      memantine (NAMENDA) 10 MG Tab Take 1 tablet (10 mg total) by mouth 2 (two) times daily. 180 tablet 4    metoprolol succinate (TOPROL-XL) 25 MG 24 hr tablet Take 1 tablet (25 mg total) by mouth once daily. 90 tablet 3    venlafaxine (EFFEXOR-XR) 150 MG Cp24 Take 1 capsule (150 mg total) by mouth once daily. 90 capsule 3       Social History[1]     No family history on file.     Patient Care Team:  Sb Martinez MD as PCP - General (Family Medicine)  Mathew, Javier BLEVINS MD as Consulting Physician (Neurology)  Rehab, [facility])     Opioid Screening: Patient medication list reviewed, patient is not taking prescription opioids. Patient is not using additional opioids than prescribed. Patient is at low risk of substance abuse based on this opioid use history.       Subjective:     Review of Systems   Constitutional: Negative.  Negative for malaise/fatigue and weight loss.   HENT: Negative.     Eyes: Negative.    Respiratory: Negative.  Negative for shortness of  breath.    Cardiovascular: Negative.  Negative for chest pain.   Gastrointestinal: Negative.    Genitourinary: Negative.    Musculoskeletal: Negative.    Skin: Negative.    Neurological: Negative.  Negative for focal weakness, weakness and headaches.   Endo/Heme/Allergies: Negative.    Psychiatric/Behavioral: Negative.  Negative for depression and memory loss. The patient is not nervous/anxious.          Patient Reported Health Risk Assessment  [age]  Are you male or female?: Female  During the past four weeks, how much have you been bothered by emotional problems such as feeling anxious, depressed, irritable, sad, or downhearted and blue?: Not at all  During the past five weeks, has your physical and/or emotional health limited your social activities with family, friends, neighbors, or groups?: Not at all  During the past four weeks, how much bodily pain have you generally had?: No pain  During the past four weeks, was someone available to help if you needed and wanted help?: Yes, as much as I wanted  During the past four weeks, what was the hardest physical activity you could do for at least two minutes?: Light  Can you get to places out of walking distance without help?  (For example, can you travel alone on buses or taxis, or drive your own car?): Yes  Can you go shopping for groceries or clothes without someone's help?: Yes  Can you prepare your own meals?: Yes  Can you do your own housework without help?: Yes  Because of any health problems, do you need the help of another person with your personal care needs such as eating, bathing, dressing, or getting around the house?: No  Can you handle your own money without help?: Yes  During the past four weeks, how would you rate your health in general?: Good  How have things been going for you during the past four weeks?: Pretty well  Are you having difficulties driving your car?: Not applicable, I do not use a car  Do you always fasten your seat  "belt when you are in a car?: Yes, usually  How often in the past four weeks have you been bothered by falling or dizzy when standing up?: Never  How often in the past four weeks have you been bothered by sexual problems?: Never  How often in the past four weeks have you been bothered by trouble eating well?: Never  How often in the past four weeks have you been bothered by teeth or denture problems?: Never  How often in the past four weeks have you been bothered with problems using the telephone?: Never  How often in the past four weeks have you been bothered by tiredness or fatigue?: Never  Have you fallen two or more times in the past year?: No  Are you afraid of falling?: No  Are you a smoker?: No  During the past four weeks, how many drinks of wine, beer, or other alcoholic beverages did you have?: No alcohol at all  Do you exercise for about 20 minutes three or more days a week?: No, I usually do not exercise this much  Have you been given any information to help you with hazards in your house that might hurt you?: No  Have you been given any information to help you with keeping track of your medications?: No  How often do you have trouble taking medicines the way you've been told to take them?: I always take them as prescribed  How confident are you that you can control and manage most of your health problems?: Very confident    Objective:     /88   Pulse 61   Resp 18   Ht 5' 11" (1.803 m)   Wt 98.9 kg (218 lb)   SpO2 98%   BMI 30.40 kg/m²     Physical Exam  Constitutional:       Appearance: She is obese.   HENT:      Head: Normocephalic.      Mouth/Throat:      Mouth: Mucous membranes are moist.      Pharynx: Oropharynx is clear.   Eyes:      Pupils: Pupils are equal, round, and reactive to light.   Cardiovascular:      Rate and Rhythm: Normal rate and regular rhythm.   Pulmonary:      Effort: Pulmonary effort is normal.      Breath sounds: Normal breath sounds.   Abdominal:      Palpations: " Abdomen is soft.   Musculoskeletal:         General: Normal range of motion.   Skin:     General: Skin is warm and dry.   Neurological:      General: No focal deficit present.      Mental Status: She is alert.   Psychiatric:         Mood and Affect: Mood normal.         Behavior: Behavior normal.         Thought Content: Thought content normal.         Judgment: Judgment normal.         Lab Results   Component Value Date     04/17/2025    K 3.9 04/17/2025     04/17/2025    CO2 29 04/17/2025    BUN 17.9 04/17/2025    CREATININE 1.18 (H) 04/17/2025    CALCIUM 8.8 04/17/2025    ALBUMIN 3.4 04/17/2025    BILITOT 0.3 04/17/2025    ALKPHOS 93 04/17/2025    AST 13 04/17/2025    ALT 6 04/17/2025    ANIONGAP 8 09/22/2022    ESTGFRAFRICA 52 09/22/2022    EGFRNORACEVR 50 04/17/2025     Lab Results   Component Value Date    WBC 7.09 04/17/2025    RBC 5.04 04/17/2025    HGB 13.9 04/17/2025    HCT 42.7 04/17/2025    MCV 84.7 04/17/2025    RDW 14.2 04/17/2025     04/17/2025      Lab Results   Component Value Date    CHOL 220 (H) 04/17/2025    TRIG 177 (H) 04/17/2025    HDL 42 04/17/2025    .00 (H) 04/17/2025    TOTALCHOLEST 5 04/17/2025     Lab Results   Component Value Date    TSH 1.802 04/17/2025     Lab Results   Component Value Date    HGBA1C 5.8 04/17/2025             No data to display                  4/30/2024     9:45 AM 4/18/2024    10:00 AM 4/27/2023    10:00 AM 4/17/2023    10:30 AM 7/28/2022     9:00 AM   Fall Risk Assessment - Outpatient   Mobility Status Ambulatory Ambulatory Ambulatory Ambulatory Ambulatory   Number of falls 0 0 1 1 0   Identified as fall risk False False False False False           Depression Screening  Over the past two weeks, has the patient felt down, depressed, or hopeless?: No  Over the past two weeks, has the patient felt little interest or pleasure in doing things?: No  Functional Ability/Safety Screening  Was the patient's timed Up & Go test unsteady or longer  than 30 seconds?: No  Does the patient need help with phone, transportation, shopping, preparing meals, housework, laundry, meds, or managing money?: No  Does the patient's home have rugs in the hallway, lack grab bars in the bathroom, lack handrails on the stairs or have poor lighting?: No  Have you noticed any hearing difficulties?: No  Cognitive Function (Assessed through direct observation with due consideration of information obtained by way of patient reports and/or concerns raised by family, friends, caretakers, or others)    Does the patient repeat questions/statements in the same day?: No  Does the patient have trouble remembering the date, year, and time?: No  Does the patient have difficulty managing finances?: No  Does the patient have a decreased sense of direction?: No  Assessment:       ICD-10-CM ICD-9-CM   1. Medicare annual wellness visit, subsequent  Z00.00 V70.0   2. Primary hypertension  I10 401.9   3. Prediabetes  R73.03 790.29   4. Stage 3b chronic kidney disease  N18.32 585.3   5. Dyslipidemia  E78.5 272.4   6. Late onset Alzheimer's dementia without behavioral disturbance  G30.1 331.0    F02.80 294.10   7. History of CVA (cerebrovascular accident)  Z86.73 V12.54   8. Recurrent major depressive disorder, in partial remission  F33.41 296.35   9. Paroxysmal atrial fibrillation  I48.0 427.31   10. Vitamin D deficiency  E55.9 268.9   11. Vitamin B 12 deficiency  E53.8 266.2   12. Anxiety  F41.9 300.00   13. MCI (mild cognitive impairment)  G31.84 331.83   14. Hypertension, unspecified type  I10 401.9   15. History of atrial fibrillation  Z86.79 V12.59        Plan:     Medicare Annual Wellness and Personalized Prevention Plan:   Fall Risk + Home Safety + Hearing Impairment + Depression Screen + Cognitive Impairment Screen + Health Risk Assessment all reviewed.       Health Maintenance Topics with due status: Not Due       Topic Last Completion Date    Hemoglobin A1c (Prediabetes) 04/17/2025     Lipid Panel 04/17/2025      The patient's Health Maintenance was reviewed and the following appears to be due at this time:   Health Maintenance Due   Topic Date Due    Annual UACr  Never done    High Dose Statin  Never done    Shingles Vaccine (2 of 3) 12/01/2015    Pneumococcal Vaccines (Age 50+) (2 of 2 - PCV) 01/27/2018     Health risk assessment:  After review of the patient's medical record as it relates to health risk assessment over the next 5-10 years per recommendations of the USPSTF, it was determined that all pertinent recommendations have been appropriately addressed. The patient does not desire any further preventative care measures or screening tests at this time.  We will continue to reassess at each annual visit.    1. Medicare annual wellness visit, subsequent  Comments:  Overall health status was reviewed.  Good health habits reinforced.  Annual wellness exams recommended.    2. Primary hypertension  Overview:  Prescribed amlodipine 2.5 mg daily. Also takes Toprol-XL 25 mg daily, more than likely for combination of arrhythmia and HTN.     Blood pressures appear to be adequately controlled with current treatment plan, including prescription blood pressure medication. Continue medical management and continue home blood pressure checks. Blood pressure should be checked as discussed during medical visits, and average blood pressure should be no greater than 130/80.     Orders:  -     Comprehensive Metabolic Panel; Future; Expected date: 04/22/2026  -     CBC Auto Differential; Future; Expected date: 04/22/2026  -     TSH; Future; Expected date: 04/22/2026  -     metoprolol succinate (TOPROL-XL) 25 MG 24 hr tablet; Take 1 tablet (25 mg total) by mouth once daily.  Dispense: 90 tablet; Refill: 3    3. Prediabetes  Overview:  Patient has been diagnosed with prediabetes.  However, because of the patient's advanced age, the clinical importance of being prediabetic is lessened significantly.  Depending on  multiple factors, we may continue to monitor annually, or we may defer monitoring unless signs of overt diabetes occur.    Orders:  -     Hemoglobin A1C; Future; Expected date: 04/22/2026    4. Stage 3b chronic kidney disease  Overview:  Most recent GFR 50, 42 ml/min/1.73.  Monitored regularly.    Patient given the following recommendations:  -Follow low sodium diet (2 grams a day)   -Control high blood pressure ( goal BP < 130/80, please record BP at home every day and bring log to next office visit to assure that home cuff is calibrated at minimum every 12 months)   -Exercise at least 30 minutes a day, 5 days a week.   -Maintain healthy weight.   -Stay well hydrated   -Receive Pneumovax, Flu, and HBV vaccines if indicated.   -Do not take NSAIDs (Ibuprofen, Naproxen, Aleve, Advil, Toradol, Mobic), may take only Tylenol as needed for pain/headaches.   -Take cholesterol-lowering medications as prescribed (LDL goal <100)      5. Dyslipidemia  Overview:  Currently not being prescribed any lipid-lowering medication.    Due to the patient's advanced age, prescribing statins or other lipid lowering medications is not clinically indicated.  Patient will continue to eat a healthy diet, low in saturated fats, and high in fiber.    Orders:  -     Lipid Panel; Future; Expected date: 04/22/2026    6. Late onset Alzheimer's dementia without behavioral disturbance  Overview:  Prescribed memantine 10 mg b.i.d..  Sees Dr. Wade, neurology.    Her dementia does seem to be progressing steadily.    Patient is currently being evaluated and treated for dementia.  Patient is also taking medication for the purposes of treating this problem.  Reports no significant side effects or problems with the medications, also reporting that memory loss and mental status changes have been stable for the most part.      7. History of CVA (cerebrovascular accident)  Overview:  Prescribed Plavix 75 mg daily.    Restarted during her hospitalization on  08/10/2024.  We will keep her on this medication for the time being, possibly indefinitely.      8. Recurrent major depressive disorder, in partial remission  Overview:  Patient prescribed Effexor  mg daily.    Patient reports stability of moods, and effectiveness of medications, including no agitation, significant somnolence, or significant bouts of anxiety or depression.  Patient is not having any sleep disturbance.  Current treatment plan will continue, with plans to discuss any significant changes in patient's status if necessary if anything changes in the future..    Medication will be continued at current dosage.      9. Paroxysmal atrial fibrillation  Overview:  Except from her last cardiology note in 2021:      Ms. Marge Thompson is a 65-year-old female with a history of WPW with ablation in 1997, paroxysmal atrial fibrillation, previous CVA, hypertension, and hyperlipidemia. She has had no palpitations since I last saw her in 2019. She denies chest pain or symptoms of congestive heart failure. Her blood pressure appears to be well controlled.     Due to her current health condition, she is no longer seeing her cardiologist, we are managing this.  She is in sinus rhythm today.  No complaints or symptoms.    It appears that because of her ablation she is no longer in atrial fibrillation, we will discontinue her Plavix on 04/23/2025, and resolve this condition.         10. Vitamin D deficiency  Overview:  Advised to continue (if taking) vitamin-D supplementation at current level.  Levels will/may be checked annually.  If vitamin-D level is low, instructed to either start vitamin-D supplementation as directed or increase current level/dosage of supplementation.      11. Vitamin B 12 deficiency  Overview:  Patient does take B12 supplementation, continue current level of supplementation.      12. Anxiety  -     venlafaxine (EFFEXOR-XR) 150 MG Cp24; Take 1 capsule (150 mg total) by mouth once daily.   Dispense: 90 capsule; Refill: 3    13. MCI (mild cognitive impairment)  -     memantine (NAMENDA) 10 MG Tab; Take 1 tablet (10 mg total) by mouth 2 (two) times daily.  Dispense: 180 tablet; Refill: 4    14. Hypertension, unspecified type  -     amLODIPine (NORVASC) 2.5 MG tablet; Take 1 tablet (2.5 mg total) by mouth once daily.  Dispense: 90 tablet; Refill: 3    15. History of atrial fibrillation  Overview:  Except from her last cardiology note in 2021:      Ms. Marge Thompson is a 65-year-old female with a history of WPW with ablation in 1997, paroxysmal atrial fibrillation, previous CVA, hypertension, and hyperlipidemia. She has had no palpitations since I last saw her in 2019. She denies chest pain or symptoms of congestive heart failure. Her blood pressure appears to be well controlled.     It does appear that her atrial fibrillation was treated successfully, and she is no longer having episodes of AFib.  For that reason, we will discontinue her Plavix, this certainly puts her at an increased risk of bleeding, and with her apparent resolution of AFib is no longer necessary.              Advance Care Planning     Date: 04/22/2025    Living Will  During this visit, I engaged the patient  in the voluntary advance care planning process.  The patient and I reviewed the role for advance directives and their purpose in directing future healthcare if the patient's unable to speak for him/herself.  At this point in time, the patient does have full decision-making capacity.  We discussed different extreme health states that she could experience, and reviewed what kind of medical care she would want in those situations.  The patient communicated that if she were comatose and had little chance of a meaningful recovery, she would not want machines/life-sustaining treatments used. In addition to the above preference, other important end-of-life issues for the patient include no other issues. The patient has completed a  living will to reflect these preferences.  I spent a total of 5 minutes engaging the patient in this advance care planning discussion.              Current Outpatient Medications   Medication Instructions    amLODIPine (NORVASC) 2.5 mg, Oral, Daily    cholecalciferol, vitamin D3, (VITAMIN D3 ORAL) 2,000 Units, Daily    cyanocobalamin (VITAMIN B-12) 1,000 mcg, Daily    memantine (NAMENDA) 10 mg, Oral, 2 times daily    metoprolol succinate (TOPROL-XL) 25 mg, Oral, Daily    venlafaxine (EFFEXOR-XR) 150 mg, Oral, Daily        Follow up in about 1 year (around 4/27/2026) for Medicare Wellness. In addition to their scheduled follow up, the patient has also been instructed to follow up on as needed basis.     This note was created with the assistance of [employer] voice recognition software or phone dictation. There may be transcription errors as a result of using this technology. Effort has been made to assure accuracy of transcription but any obvious errors or omissions should be clarified with the author of the document.         [1]   Social History  Socioeconomic History    Marital status:    Tobacco Use    Smoking status: Some Days    Smokeless tobacco: Never    Tobacco comments:     Synthetic Marijuana   Substance and Sexual Activity    Alcohol use: Not Currently    Drug use: Yes     Types: Marijuana     Comment: Synthetic

## 2025-04-23 ENCOUNTER — OFFICE VISIT (OUTPATIENT)
Dept: PRIMARY CARE CLINIC | Facility: CLINIC | Age: 70
End: 2025-04-23
Payer: MEDICARE

## 2025-04-23 ENCOUNTER — RESULTS FOLLOW-UP (OUTPATIENT)
Dept: PRIMARY CARE CLINIC | Facility: CLINIC | Age: 70
End: 2025-04-23

## 2025-04-23 VITALS
HEIGHT: 71 IN | OXYGEN SATURATION: 98 % | SYSTOLIC BLOOD PRESSURE: 133 MMHG | HEART RATE: 61 BPM | RESPIRATION RATE: 18 BRPM | BODY MASS INDEX: 30.52 KG/M2 | WEIGHT: 218 LBS | DIASTOLIC BLOOD PRESSURE: 88 MMHG

## 2025-04-23 DIAGNOSIS — Z00.00 MEDICARE ANNUAL WELLNESS VISIT, SUBSEQUENT: Primary | ICD-10-CM

## 2025-04-23 DIAGNOSIS — G31.84 MCI (MILD COGNITIVE IMPAIRMENT): ICD-10-CM

## 2025-04-23 DIAGNOSIS — I10 HYPERTENSION, UNSPECIFIED TYPE: ICD-10-CM

## 2025-04-23 DIAGNOSIS — I48.0 PAROXYSMAL ATRIAL FIBRILLATION: Chronic | ICD-10-CM

## 2025-04-23 DIAGNOSIS — G30.1 LATE ONSET ALZHEIMER'S DEMENTIA WITHOUT BEHAVIORAL DISTURBANCE: Chronic | ICD-10-CM

## 2025-04-23 DIAGNOSIS — I10 PRIMARY HYPERTENSION: Chronic | ICD-10-CM

## 2025-04-23 DIAGNOSIS — F41.9 ANXIETY: ICD-10-CM

## 2025-04-23 DIAGNOSIS — E53.8 VITAMIN B 12 DEFICIENCY: Chronic | ICD-10-CM

## 2025-04-23 DIAGNOSIS — E55.9 VITAMIN D DEFICIENCY: Chronic | ICD-10-CM

## 2025-04-23 DIAGNOSIS — E78.5 DYSLIPIDEMIA: Chronic | ICD-10-CM

## 2025-04-23 DIAGNOSIS — N18.32 STAGE 3B CHRONIC KIDNEY DISEASE: Chronic | ICD-10-CM

## 2025-04-23 DIAGNOSIS — F02.80 LATE ONSET ALZHEIMER'S DEMENTIA WITHOUT BEHAVIORAL DISTURBANCE: Chronic | ICD-10-CM

## 2025-04-23 DIAGNOSIS — Z86.73 HISTORY OF CVA (CEREBROVASCULAR ACCIDENT): Chronic | ICD-10-CM

## 2025-04-23 DIAGNOSIS — Z86.79 HISTORY OF ATRIAL FIBRILLATION: Chronic | ICD-10-CM

## 2025-04-23 DIAGNOSIS — F33.41 RECURRENT MAJOR DEPRESSIVE DISORDER, IN PARTIAL REMISSION: Chronic | ICD-10-CM

## 2025-04-23 DIAGNOSIS — R73.03 PREDIABETES: Chronic | ICD-10-CM

## 2025-04-23 PROBLEM — Z79.82 LONG TERM (CURRENT) USE OF ASPIRIN: Status: RESOLVED | Noted: 2022-01-01 | Resolved: 2025-04-23

## 2025-04-23 RX ORDER — MEMANTINE HYDROCHLORIDE 10 MG/1
10 TABLET ORAL 2 TIMES DAILY
Qty: 180 TABLET | Refills: 4 | Status: SHIPPED | OUTPATIENT
Start: 2025-04-23

## 2025-04-23 RX ORDER — METOPROLOL SUCCINATE 25 MG/1
25 TABLET, EXTENDED RELEASE ORAL DAILY
Qty: 90 TABLET | Refills: 3 | Status: SHIPPED | OUTPATIENT
Start: 2025-04-23 | End: 2026-04-23

## 2025-04-23 RX ORDER — VENLAFAXINE HYDROCHLORIDE 150 MG/1
150 CAPSULE, EXTENDED RELEASE ORAL DAILY
Qty: 90 CAPSULE | Refills: 3 | Status: SHIPPED | OUTPATIENT
Start: 2025-04-23 | End: 2026-04-23

## 2025-04-23 RX ORDER — AMLODIPINE BESYLATE 2.5 MG/1
2.5 TABLET ORAL DAILY
Qty: 90 TABLET | Refills: 3 | Status: SHIPPED | OUTPATIENT
Start: 2025-04-23 | End: 2026-04-23

## 2025-04-30 ENCOUNTER — OFFICE VISIT (OUTPATIENT)
Dept: NEUROLOGY | Facility: CLINIC | Age: 70
End: 2025-04-30
Payer: MEDICARE

## 2025-04-30 VITALS
DIASTOLIC BLOOD PRESSURE: 67 MMHG | BODY MASS INDEX: 30.52 KG/M2 | HEIGHT: 71 IN | WEIGHT: 218 LBS | SYSTOLIC BLOOD PRESSURE: 110 MMHG

## 2025-04-30 DIAGNOSIS — G31.84 MCI (MILD COGNITIVE IMPAIRMENT): ICD-10-CM

## 2025-04-30 PROCEDURE — 1159F MED LIST DOCD IN RCRD: CPT | Mod: CPTII,S$GLB,, | Performed by: PSYCHIATRY & NEUROLOGY

## 2025-04-30 PROCEDURE — 3008F BODY MASS INDEX DOCD: CPT | Mod: CPTII,S$GLB,, | Performed by: PSYCHIATRY & NEUROLOGY

## 2025-04-30 PROCEDURE — 1101F PT FALLS ASSESS-DOCD LE1/YR: CPT | Mod: CPTII,S$GLB,, | Performed by: PSYCHIATRY & NEUROLOGY

## 2025-04-30 PROCEDURE — 99213 OFFICE O/P EST LOW 20 MIN: CPT | Mod: S$GLB,,, | Performed by: PSYCHIATRY & NEUROLOGY

## 2025-04-30 PROCEDURE — 3288F FALL RISK ASSESSMENT DOCD: CPT | Mod: CPTII,S$GLB,, | Performed by: PSYCHIATRY & NEUROLOGY

## 2025-04-30 PROCEDURE — 3044F HG A1C LEVEL LT 7.0%: CPT | Mod: CPTII,S$GLB,, | Performed by: PSYCHIATRY & NEUROLOGY

## 2025-04-30 PROCEDURE — 99999 PR PBB SHADOW E&M-EST. PATIENT-LVL III: CPT | Mod: PBBFAC,,, | Performed by: PSYCHIATRY & NEUROLOGY

## 2025-04-30 PROCEDURE — 1126F AMNT PAIN NOTED NONE PRSNT: CPT | Mod: CPTII,S$GLB,, | Performed by: PSYCHIATRY & NEUROLOGY

## 2025-04-30 PROCEDURE — 3074F SYST BP LT 130 MM HG: CPT | Mod: CPTII,S$GLB,, | Performed by: PSYCHIATRY & NEUROLOGY

## 2025-04-30 PROCEDURE — 3078F DIAST BP <80 MM HG: CPT | Mod: CPTII,S$GLB,, | Performed by: PSYCHIATRY & NEUROLOGY

## 2025-04-30 RX ORDER — MEMANTINE HYDROCHLORIDE 10 MG/1
10 TABLET ORAL 2 TIMES DAILY
Qty: 180 TABLET | Refills: 4 | Status: SHIPPED | OUTPATIENT
Start: 2025-04-30

## 2025-04-30 RX ORDER — NAPROXEN SODIUM 220 MG/1
81 TABLET, FILM COATED ORAL DAILY
COMMUNITY

## 2025-04-30 NOTE — PROGRESS NOTES
Subjective     Patient ID: Marge Thompson is a 69 y.o. female.    Chief Complaint: Dementia    HPI  In assisted living >1 year  Doing well  Not exercising  On memantine  Memory/depression are stable  Review of Systems  The remainder of the 14 system ROS is noncontributory or negative unless mentioned/reviewed above.       Objective     Physical Exam  Mental Status: Alert and oriented x3. Language is fluent with good comprehension.    Cranial Nerve: Pupils are equal, round, and reactive to light. Visual fields are intact to confrontation. Normal fundi. Ocular movements are intact. Face is symmetric at rest and with activation with intact sensation throughout. Hearing intact to finger rub bilaterally. Muscles of tongue and palate activate symmetrically. No dysarthria. Strength is full in sternocleidomastoid and trapezius bilaterally.    Motor: Muscle bulk and tone are normal. Strength is 5/5 in all four extremities both proximally and distally. Intact fine motor movements bilaterally. There is no pronator drift or satelliting on arm roll.    Sensory: Sensation is intact to light touch, pinprick, vibration, and proprioception throughout. Romberg is negative.    Reflexes: 2+ and symmetric at the biceps, triceps, brachioradialis, patella, and Achilles bilaterally. Plantar response is flexor bilaterally.    Coordination: No dysmetria on finger-nose-finger or heel-knee-shin. Normal rapid alternating movements. Fast finger tapping with normal amplitude and speed.    Gait: Narrow based with normal stride length and good arm swing bilaterally. Able to walk on heels, toes, and in tandem.       Assessment and Plan     1. MCI (mild cognitive impairment)  -     memantine (NAMENDA) 10 MG Tab; Take 1 tablet (10 mg total) by mouth 2 (two) times daily.  Dispense: 180 tablet; Refill: 4        Continue memantine         Follow up in about 1 year (around 4/30/2026).

## 2025-05-26 DIAGNOSIS — E53.8 VITAMIN B 12 DEFICIENCY: Primary | ICD-10-CM

## 2025-05-26 DIAGNOSIS — Z86.73 HISTORY OF CVA (CEREBROVASCULAR ACCIDENT): Chronic | ICD-10-CM

## 2025-05-26 RX ORDER — LANOLIN ALCOHOL/MO/W.PET/CERES
1000 CREAM (GRAM) TOPICAL DAILY
Qty: 30 TABLET | Refills: 11 | Status: SHIPPED | OUTPATIENT
Start: 2025-05-26 | End: 2026-05-26

## 2025-05-26 RX ORDER — NAPROXEN SODIUM 220 MG/1
81 TABLET, FILM COATED ORAL DAILY
Qty: 30 TABLET | Refills: 11 | Status: SHIPPED | OUTPATIENT
Start: 2025-05-26 | End: 2026-05-26

## 2025-05-26 NOTE — TELEPHONE ENCOUNTER
Copied from CRM #[id number]. Topic: Medications - Pharmacy  >> May 23, 2025 11:31 AM Karla wrote:  .Who Called: Marge Thompson    Refill or New Rx:Refill  RX Name and Strength:cyanocobalamin (VITAMIN B-12) 1000 MCG tablet  How is the patient currently taking it? (ex. 1XDay):Take 1,000 mcg by mouth once  Is this a 30 day or 90 day RX:  Local or Mail Order:local   [facility]    Preferred Method of Contact: Phone Call  Patient's Preferred Phone Number on File: [phone]   Best Call Back Number, if different:    .Who Called: Marge Thompson    Refill or New Rx:Refill  RX Name and Strength:aspirin 81 MG Chew  How is the patient currently taking it? (ex. 1XDay): Take 81 mg by mouth once daily.  Is this a 30 day or 90 day RX:  Local or Mail Order:    Preferred Method of Contact: Phone Call  Patient's Preferred Phone Number on File: [phone]   Best Call Back Number, if different:  [facility]   Additional Information:

## 2025-06-02 PROBLEM — Z09 HOSPITAL DISCHARGE FOLLOW-UP: Status: ACTIVE | Noted: 2025-06-02

## 2025-06-03 ENCOUNTER — OFFICE VISIT (OUTPATIENT)
Dept: PRIMARY CARE CLINIC | Facility: CLINIC | Age: 70
End: 2025-06-03
Payer: MEDICARE

## 2025-06-03 DIAGNOSIS — F01.B0 MODERATE VASCULAR DEMENTIA WITHOUT BEHAVIORAL DISTURBANCE, PSYCHOTIC DISTURBANCE, MOOD DISTURBANCE, OR ANXIETY: ICD-10-CM

## 2025-06-03 DIAGNOSIS — Z09 HOSPITAL DISCHARGE FOLLOW-UP: Primary | ICD-10-CM

## 2025-06-03 DIAGNOSIS — Z86.73 HISTORY OF CVA (CEREBROVASCULAR ACCIDENT): Chronic | ICD-10-CM

## 2025-06-03 RX ORDER — AMLODIPINE BESYLATE 10 MG/1
10 TABLET ORAL DAILY
COMMUNITY
Start: 2025-05-23

## 2025-06-03 RX ORDER — APIXABAN 5 MG/1
5 TABLET, FILM COATED ORAL 2 TIMES DAILY
COMMUNITY
Start: 2025-05-23

## 2025-06-12 ENCOUNTER — TELEPHONE (OUTPATIENT)
Dept: PRIMARY CARE CLINIC | Facility: CLINIC | Age: 70
End: 2025-06-12
Payer: MEDICARE

## 2025-06-13 NOTE — TELEPHONE ENCOUNTER
Copied from CRM #[id number]. Topic: General Inquiry - Patient Advice  >> Jun 4, 2025  9:15 AM Radha wrote:  Who Called: [facility]    Caller is requesting assistance/information from provider's office.    Symptoms (please be specific):    How long has patient had these symptoms:    List of preferred pharmacies on file (remove unneeded): [unfilled]  If different, enter pharmacy into here including location and phone number:       Preferred Method of Contact: Phone Call  Patient's Preferred Phone Number on File: [phone]   Best Call Back Number, if different: [phone]  Additional Information: stated that she has questions about dx that was documented in pt chart. Requested a call back from the nurse

## 2025-06-18 DIAGNOSIS — E53.8 VITAMIN B 12 DEFICIENCY: ICD-10-CM

## 2025-06-18 DIAGNOSIS — I10 PRIMARY HYPERTENSION: Chronic | ICD-10-CM

## 2025-06-18 DIAGNOSIS — F41.9 ANXIETY: ICD-10-CM

## 2025-06-18 DIAGNOSIS — G31.84 MCI (MILD COGNITIVE IMPAIRMENT): ICD-10-CM

## 2025-06-18 RX ORDER — METOPROLOL SUCCINATE 25 MG/1
25 TABLET, EXTENDED RELEASE ORAL DAILY
Qty: 90 TABLET | Refills: 3 | Status: SHIPPED | OUTPATIENT
Start: 2025-06-18 | End: 2026-06-18

## 2025-06-18 RX ORDER — MEMANTINE HYDROCHLORIDE 10 MG/1
10 TABLET ORAL 2 TIMES DAILY
Qty: 180 TABLET | Refills: 4 | Status: SHIPPED | OUTPATIENT
Start: 2025-06-18 | End: 2025-06-18 | Stop reason: SDUPTHER

## 2025-06-18 RX ORDER — METOPROLOL SUCCINATE 25 MG/1
25 TABLET, EXTENDED RELEASE ORAL DAILY
Qty: 90 TABLET | Refills: 3 | Status: SHIPPED | OUTPATIENT
Start: 2025-06-18 | End: 2025-06-18 | Stop reason: SDUPTHER

## 2025-06-18 RX ORDER — VENLAFAXINE HYDROCHLORIDE 150 MG/1
150 CAPSULE, EXTENDED RELEASE ORAL DAILY
Qty: 90 CAPSULE | Refills: 3 | Status: SHIPPED | OUTPATIENT
Start: 2025-06-18 | End: 2025-06-18 | Stop reason: SDUPTHER

## 2025-06-18 RX ORDER — VENLAFAXINE HYDROCHLORIDE 150 MG/1
150 CAPSULE, EXTENDED RELEASE ORAL DAILY
Qty: 90 CAPSULE | Refills: 3 | Status: SHIPPED | OUTPATIENT
Start: 2025-06-18 | End: 2026-06-18

## 2025-06-18 RX ORDER — MEMANTINE HYDROCHLORIDE 10 MG/1
10 TABLET ORAL 2 TIMES DAILY
Qty: 180 TABLET | Refills: 4 | Status: SHIPPED | OUTPATIENT
Start: 2025-06-18

## 2025-06-18 RX ORDER — LANOLIN ALCOHOL/MO/W.PET/CERES
1000 CREAM (GRAM) TOPICAL DAILY
Qty: 30 TABLET | Refills: 11 | Status: SHIPPED | OUTPATIENT
Start: 2025-06-18 | End: 2026-06-18

## 2025-06-30 DIAGNOSIS — I10 PRIMARY HYPERTENSION: Chronic | ICD-10-CM

## 2025-06-30 DIAGNOSIS — E78.5 DYSLIPIDEMIA: Chronic | ICD-10-CM

## 2025-06-30 DIAGNOSIS — I45.6: ICD-10-CM

## 2025-06-30 DIAGNOSIS — Z86.79 HISTORY OF ATRIAL FIBRILLATION: Chronic | ICD-10-CM

## 2025-06-30 RX ORDER — ERGOCALCIFEROL 1.25 MG/1
50000 CAPSULE ORAL
COMMUNITY

## 2025-06-30 RX ORDER — CLOPIDOGREL BISULFATE 75 MG/1
75 TABLET ORAL EVERY MORNING
COMMUNITY
Start: 2025-06-20 | End: 2026-06-20

## 2025-06-30 RX ORDER — ATORVASTATIN CALCIUM 40 MG/1
40 TABLET, FILM COATED ORAL DAILY
Qty: 90 TABLET | Refills: 3 | Status: SHIPPED | OUTPATIENT
Start: 2025-06-30 | End: 2026-06-30

## 2025-08-11 RX ORDER — CLOPIDOGREL BISULFATE 75 MG/1
75 TABLET ORAL EVERY MORNING
Qty: 30 TABLET | Refills: 11 | Status: SHIPPED | OUTPATIENT
Start: 2025-08-11 | End: 2025-08-13 | Stop reason: SDUPTHER

## 2025-08-12 ENCOUNTER — TELEPHONE (OUTPATIENT)
Dept: PRIMARY CARE CLINIC | Facility: CLINIC | Age: 70
End: 2025-08-12
Payer: MEDICARE

## 2025-08-13 DIAGNOSIS — Z86.79 HISTORY OF ATRIAL FIBRILLATION: Primary | ICD-10-CM

## 2025-08-13 RX ORDER — CLOPIDOGREL BISULFATE 75 MG/1
75 TABLET ORAL EVERY MORNING
Qty: 30 TABLET | Refills: 11 | Status: SHIPPED | OUTPATIENT
Start: 2025-08-13 | End: 2026-08-13

## 2025-08-26 ENCOUNTER — TELEPHONE (OUTPATIENT)
Dept: PRIMARY CARE CLINIC | Facility: CLINIC | Age: 70
End: 2025-08-26
Payer: MEDICARE

## 2025-08-26 DIAGNOSIS — I10 PRIMARY HYPERTENSION: Primary | ICD-10-CM

## 2025-08-26 RX ORDER — AMLODIPINE BESYLATE 10 MG/1
10 TABLET ORAL DAILY
Qty: 90 TABLET | Refills: 3 | Status: SHIPPED | OUTPATIENT
Start: 2025-08-26 | End: 2026-08-26